# Patient Record
Sex: FEMALE | Race: BLACK OR AFRICAN AMERICAN | NOT HISPANIC OR LATINO | Employment: UNEMPLOYED | URBAN - METROPOLITAN AREA
[De-identification: names, ages, dates, MRNs, and addresses within clinical notes are randomized per-mention and may not be internally consistent; named-entity substitution may affect disease eponyms.]

---

## 2017-09-27 ENCOUNTER — HISTORICAL (OUTPATIENT)
Dept: INTERNAL MEDICINE | Facility: CLINIC | Age: 52
End: 2017-09-27

## 2017-09-29 ENCOUNTER — HISTORICAL (OUTPATIENT)
Dept: INTERNAL MEDICINE | Facility: CLINIC | Age: 52
End: 2017-09-29

## 2017-09-29 LAB
ABS NEUT (OLG): 8.75 X10(3)/MCL (ref 2.1–9.2)
ALBUMIN SERPL-MCNC: 3.4 GM/DL (ref 3.4–5)
ALBUMIN/GLOB SERPL: 1 RATIO (ref 1–2)
ALP SERPL-CCNC: 72 UNIT/L (ref 45–117)
ALT SERPL-CCNC: 19 UNIT/L (ref 12–78)
AST SERPL-CCNC: 15 UNIT/L (ref 15–37)
BASOPHILS # BLD AUTO: 0.06 X10(3)/MCL
BASOPHILS NFR BLD AUTO: 0 % (ref 0–1)
BILIRUB SERPL-MCNC: 0.3 MG/DL (ref 0.2–1)
BILIRUBIN DIRECT+TOT PNL SERPL-MCNC: <0.1 MG/DL
BILIRUBIN DIRECT+TOT PNL SERPL-MCNC: >0.2 MG/DL
BUN SERPL-MCNC: 14 MG/DL (ref 7–18)
CALCIUM SERPL-MCNC: 9 MG/DL (ref 8.5–10.1)
CHLORIDE SERPL-SCNC: 103 MMOL/L (ref 98–107)
CHOLEST SERPL-MCNC: 202 MG/DL
CHOLEST/HDLC SERPL: 4.3 {RATIO} (ref 0–4.4)
CO2 SERPL-SCNC: 31 MMOL/L (ref 21–32)
CREAT SERPL-MCNC: 0.6 MG/DL (ref 0.6–1.3)
CREAT UR-MCNC: 73 MG/DL
CREAT UR-MCNC: 73 MG/DL
EOSINOPHIL # BLD AUTO: 0.63 X10(3)/MCL
EOSINOPHIL NFR BLD AUTO: 4 % (ref 0–5)
ERYTHROCYTE [DISTWIDTH] IN BLOOD BY AUTOMATED COUNT: 14.9 % (ref 11.5–14.5)
EST. AVERAGE GLUCOSE BLD GHB EST-MCNC: 169 MG/DL
GLOBULIN SER-MCNC: 4.1 GM/ML (ref 2.3–3.5)
GLUCOSE SERPL-MCNC: 175 MG/DL (ref 74–106)
HBA1C MFR BLD: 7.5 % (ref 4.2–6.3)
HCT VFR BLD AUTO: 39.1 % (ref 35–46)
HDLC SERPL-MCNC: 47 MG/DL
HGB BLD-MCNC: 13 GM/DL (ref 12–16)
IMM GRANULOCYTES # BLD AUTO: 0.04 10*3/UL
IMM GRANULOCYTES NFR BLD AUTO: 0 %
LDLC SERPL CALC-MCNC: 128 MG/DL (ref 0–130)
LYMPHOCYTES # BLD AUTO: 4.44 X10(3)/MCL
LYMPHOCYTES NFR BLD AUTO: 30 % (ref 15–40)
MCH RBC QN AUTO: 29.1 PG (ref 26–34)
MCHC RBC AUTO-ENTMCNC: 33.2 GM/DL (ref 31–37)
MCV RBC AUTO: 87.5 FL (ref 80–100)
MICROALBUMIN UR-MCNC: <5 MG/L (ref 0–19)
MICROALBUMIN/CREAT RATIO PNL UR: <6.8 MCG/MG CR (ref 0–29)
MONOCYTES # BLD AUTO: 0.92 X10(3)/MCL
MONOCYTES NFR BLD AUTO: 6 % (ref 4–12)
NEUTROPHILS # BLD AUTO: 8.75 X10(3)/MCL
NEUTROPHILS NFR BLD AUTO: 59 X10(3)/MCL
PLATELET # BLD AUTO: 256 X10(3)/MCL (ref 130–400)
PMV BLD AUTO: 11.1 FL (ref 7.4–10.4)
POTASSIUM SERPL-SCNC: 4.4 MMOL/L (ref 3.5–5.1)
PROT SERPL-MCNC: 7.5 GM/DL (ref 6.4–8.2)
PROT UR STRIP-MCNC: <5 MG/DL
PROT/CREAT UR-RTO: <68.5 MG/GM
RBC # BLD AUTO: 4.47 X10(6)/MCL (ref 4–5.2)
SODIUM SERPL-SCNC: 140 MMOL/L (ref 136–145)
TRIGL SERPL-MCNC: 133 MG/DL
VLDLC SERPL CALC-MCNC: 27 MG/DL
WBC # SPEC AUTO: 14.8 X10(3)/MCL (ref 4.5–11)

## 2018-06-14 ENCOUNTER — HISTORICAL (OUTPATIENT)
Dept: INTERNAL MEDICINE | Facility: CLINIC | Age: 53
End: 2018-06-14

## 2018-06-14 LAB
EST. AVERAGE GLUCOSE BLD GHB EST-MCNC: 166 MG/DL
HBA1C MFR BLD: 7.4 % (ref 4.2–6.3)

## 2018-10-04 ENCOUNTER — HISTORICAL (OUTPATIENT)
Dept: ADMINISTRATIVE | Facility: HOSPITAL | Age: 53
End: 2018-10-04

## 2018-10-04 LAB
ABS NEUT (OLG): 6.96 X10(3)/MCL (ref 2.1–9.2)
ALBUMIN SERPL-MCNC: 3.4 GM/DL (ref 3.4–5)
ALBUMIN/GLOB SERPL: 1 RATIO (ref 1–2)
ALP SERPL-CCNC: 75 UNIT/L (ref 45–117)
ALT SERPL-CCNC: 21 UNIT/L (ref 12–78)
AST SERPL-CCNC: 15 UNIT/L (ref 15–37)
BASOPHILS # BLD AUTO: 0.06 X10(3)/MCL
BASOPHILS NFR BLD AUTO: 0 %
BILIRUB SERPL-MCNC: 0.4 MG/DL (ref 0.2–1)
BILIRUBIN DIRECT+TOT PNL SERPL-MCNC: 0.1 MG/DL
BILIRUBIN DIRECT+TOT PNL SERPL-MCNC: 0.3 MG/DL
BUN SERPL-MCNC: 10 MG/DL (ref 7–18)
CALCIUM SERPL-MCNC: 8.8 MG/DL (ref 8.5–10.1)
CHLORIDE SERPL-SCNC: 104 MMOL/L (ref 98–107)
CHOLEST SERPL-MCNC: 213 MG/DL
CHOLEST/HDLC SERPL: 5 {RATIO} (ref 0–4.4)
CO2 SERPL-SCNC: 30 MMOL/L (ref 21–32)
CREAT SERPL-MCNC: 0.6 MG/DL (ref 0.6–1.3)
CREAT UR-MCNC: 293 MG/DL
DEPRECATED CALCIDIOL+CALCIFEROL SERPL-MC: 12.27 NG/ML (ref 30–80)
EOSINOPHIL # BLD AUTO: 0.74 X10(3)/MCL
EOSINOPHIL NFR BLD AUTO: 6 %
ERYTHROCYTE [DISTWIDTH] IN BLOOD BY AUTOMATED COUNT: 14.6 % (ref 11.5–14.5)
EST. AVERAGE GLUCOSE BLD GHB EST-MCNC: 166 MG/DL
GLOBULIN SER-MCNC: 4.6 GM/ML (ref 2.3–3.5)
GLUCOSE SERPL-MCNC: 178 MG/DL (ref 74–106)
HBA1C MFR BLD: 7.4 % (ref 4.2–6.3)
HCT VFR BLD AUTO: 41.6 % (ref 35–46)
HDLC SERPL-MCNC: 43 MG/DL
HGB BLD-MCNC: 13.4 GM/DL (ref 12–16)
IMM GRANULOCYTES # BLD AUTO: 0.03 10*3/UL
IMM GRANULOCYTES NFR BLD AUTO: 0 %
LDLC SERPL CALC-MCNC: 140 MG/DL (ref 0–130)
LYMPHOCYTES # BLD AUTO: 4.05 X10(3)/MCL
LYMPHOCYTES NFR BLD AUTO: 32 % (ref 13–40)
MCH RBC QN AUTO: 28.3 PG (ref 26–34)
MCHC RBC AUTO-ENTMCNC: 32.2 GM/DL (ref 31–37)
MCV RBC AUTO: 87.8 FL (ref 80–100)
MICROALBUMIN UR-MCNC: 20 MG/L (ref 0–19)
MICROALBUMIN/CREAT RATIO PNL UR: 6.8 MCG/MG CR (ref 0–29)
MONOCYTES # BLD AUTO: 0.83 X10(3)/MCL
MONOCYTES NFR BLD AUTO: 7 % (ref 4–12)
NEUTROPHILS # BLD AUTO: 6.96 X10(3)/MCL
NEUTROPHILS NFR BLD AUTO: 55 X10(3)/MCL
PLATELET # BLD AUTO: 265 X10(3)/MCL (ref 130–400)
PMV BLD AUTO: 10.9 FL (ref 7.4–10.4)
POTASSIUM SERPL-SCNC: 3.8 MMOL/L (ref 3.5–5.1)
PROT SERPL-MCNC: 8 GM/DL (ref 6.4–8.2)
RBC # BLD AUTO: 4.74 X10(6)/MCL (ref 4–5.2)
SODIUM SERPL-SCNC: 140 MMOL/L (ref 136–145)
TRIGL SERPL-MCNC: 148 MG/DL
VLDLC SERPL CALC-MCNC: 30 MG/DL
WBC # SPEC AUTO: 12.7 X10(3)/MCL (ref 4.5–11)

## 2019-04-23 ENCOUNTER — HISTORICAL (OUTPATIENT)
Dept: INTERNAL MEDICINE | Facility: CLINIC | Age: 54
End: 2019-04-23

## 2019-04-25 ENCOUNTER — HISTORICAL (OUTPATIENT)
Dept: RADIOLOGY | Facility: HOSPITAL | Age: 54
End: 2019-04-25

## 2019-05-07 ENCOUNTER — HISTORICAL (OUTPATIENT)
Dept: ADMINISTRATIVE | Facility: HOSPITAL | Age: 54
End: 2019-05-07

## 2019-05-07 LAB
ABS NEUT (OLG): 7.32 X10(3)/MCL (ref 2.1–9.2)
ALBUMIN SERPL-MCNC: 3.7 GM/DL (ref 3.4–5)
ALBUMIN/GLOB SERPL: 0.8 RATIO (ref 1.1–2)
ALP SERPL-CCNC: 82 UNIT/L (ref 45–117)
ALT SERPL-CCNC: 19 UNIT/L (ref 12–78)
APPEARANCE, UA: CLEAR
AST SERPL-CCNC: 14 UNIT/L (ref 15–37)
BACTERIA #/AREA URNS AUTO: ABNORMAL /[HPF]
BASOPHILS # BLD AUTO: 0.09 X10(3)/MCL
BASOPHILS NFR BLD AUTO: 1 %
BILIRUB SERPL-MCNC: 0.3 MG/DL (ref 0.2–1)
BILIRUB UR QL STRIP: NEGATIVE
BILIRUBIN DIRECT+TOT PNL SERPL-MCNC: 0.1 MG/DL
BILIRUBIN DIRECT+TOT PNL SERPL-MCNC: 0.2 MG/DL
BUN SERPL-MCNC: 13 MG/DL (ref 7–18)
CALCIUM SERPL-MCNC: 9.6 MG/DL (ref 8.5–10.1)
CHLORIDE SERPL-SCNC: 105 MMOL/L (ref 98–107)
CHOLEST SERPL-MCNC: 174 MG/DL
CHOLEST/HDLC SERPL: 3.6 {RATIO} (ref 0–4.4)
CO2 SERPL-SCNC: 31 MMOL/L (ref 21–32)
COLOR UR: COLORLESS
CREAT SERPL-MCNC: 0.6 MG/DL (ref 0.6–1.3)
CREAT UR-MCNC: 14 MG/DL
DEPRECATED CALCIDIOL+CALCIFEROL SERPL-MC: 61.7 NG/ML (ref 30–80)
EOSINOPHIL # BLD AUTO: 0.78 10*3/UL
EOSINOPHIL NFR BLD AUTO: 6 %
ERYTHROCYTE [DISTWIDTH] IN BLOOD BY AUTOMATED COUNT: 14.4 % (ref 11.5–14.5)
EST. AVERAGE GLUCOSE BLD GHB EST-MCNC: 171 MG/DL
GLOBULIN SER-MCNC: 4.4 GM/ML (ref 2.3–3.5)
GLUCOSE (UA): NORMAL
GLUCOSE SERPL-MCNC: 118 MG/DL (ref 74–106)
HBA1C MFR BLD: 7.6 % (ref 4.2–6.3)
HCT VFR BLD AUTO: 42.6 % (ref 35–46)
HDLC SERPL-MCNC: 48 MG/DL
HGB BLD-MCNC: 13.4 GM/DL (ref 12–16)
HGB UR QL STRIP: NEGATIVE
HYALINE CASTS #/AREA URNS LPF: ABNORMAL /[LPF]
IMM GRANULOCYTES # BLD AUTO: 0.05 10*3/UL
IMM GRANULOCYTES NFR BLD AUTO: 0 %
KETONES UR QL STRIP: NEGATIVE
LDLC SERPL CALC-MCNC: 100 MG/DL (ref 0–130)
LEUKOCYTE ESTERASE UR QL STRIP: NEGATIVE
LYMPHOCYTES # BLD AUTO: 4.04 X10(3)/MCL
LYMPHOCYTES NFR BLD AUTO: 31 % (ref 13–40)
MCH RBC QN AUTO: 28.2 PG (ref 26–34)
MCHC RBC AUTO-ENTMCNC: 31.5 GM/DL (ref 31–37)
MCV RBC AUTO: 89.7 FL (ref 80–100)
MICROALBUMIN UR-MCNC: <5 MG/L (ref 0–19)
MICROALBUMIN/CREAT RATIO PNL UR: <35.7 MCG/MG CR (ref 0–29)
MONOCYTES # BLD AUTO: 0.79 X10(3)/MCL
MONOCYTES NFR BLD AUTO: 6 % (ref 4–12)
NEUTROPHILS # BLD AUTO: 7.32 X10(3)/MCL
NEUTROPHILS NFR BLD AUTO: 56 X10(3)/MCL
NITRITE UR QL STRIP: NEGATIVE
PH UR STRIP: 5.5 [PH] (ref 4.5–8)
PLATELET # BLD AUTO: 279 X10(3)/MCL (ref 130–400)
PMV BLD AUTO: 11.3 FL (ref 7.4–10.4)
POTASSIUM SERPL-SCNC: 3.7 MMOL/L (ref 3.5–5.1)
PROT SERPL-MCNC: 8.1 GM/DL (ref 6.4–8.2)
PROT UR QL STRIP: NEGATIVE
RBC # BLD AUTO: 4.75 X10(6)/MCL (ref 4–5.2)
RBC #/AREA URNS AUTO: ABNORMAL /[HPF]
SODIUM SERPL-SCNC: 140 MMOL/L (ref 136–145)
SP GR UR STRIP: 1 (ref 1–1.03)
SQUAMOUS #/AREA URNS LPF: <1 /[LPF]
TRIGL SERPL-MCNC: 131 MG/DL
UROBILINOGEN UR STRIP-ACNC: NORMAL
VLDLC SERPL CALC-MCNC: 26 MG/DL
WBC # SPEC AUTO: 13.1 X10(3)/MCL (ref 4.5–11)
WBC #/AREA URNS AUTO: ABNORMAL /HPF

## 2019-05-13 ENCOUNTER — HISTORICAL (OUTPATIENT)
Dept: RADIOLOGY | Facility: HOSPITAL | Age: 54
End: 2019-05-13

## 2019-06-25 ENCOUNTER — HISTORICAL (OUTPATIENT)
Dept: RADIOLOGY | Facility: HOSPITAL | Age: 54
End: 2019-06-25

## 2020-01-09 ENCOUNTER — HISTORICAL (OUTPATIENT)
Dept: CARDIOLOGY | Facility: CLINIC | Age: 55
End: 2020-01-09

## 2020-01-09 ENCOUNTER — HISTORICAL (OUTPATIENT)
Dept: ADMINISTRATIVE | Facility: HOSPITAL | Age: 55
End: 2020-01-09

## 2020-01-09 LAB
ALBUMIN SERPL-MCNC: 3.7 GM/DL (ref 3.4–5)
ALBUMIN/GLOB SERPL: 0.9 RATIO (ref 1.1–2)
ALP SERPL-CCNC: 72 UNIT/L (ref 45–117)
ALT SERPL-CCNC: 17 UNIT/L (ref 12–78)
AST SERPL-CCNC: 12 UNIT/L (ref 15–37)
BILIRUB SERPL-MCNC: 0.5 MG/DL (ref 0.2–1)
BILIRUBIN DIRECT+TOT PNL SERPL-MCNC: 0.1 MG/DL (ref 0–0.2)
BILIRUBIN DIRECT+TOT PNL SERPL-MCNC: 0.4 MG/DL
BUN SERPL-MCNC: 12 MG/DL (ref 7–18)
CALCIUM SERPL-MCNC: 9.3 MG/DL (ref 8.5–10.1)
CHLORIDE SERPL-SCNC: 104 MMOL/L (ref 98–107)
CHOLEST SERPL-MCNC: 187 MG/DL
CHOLEST/HDLC SERPL: 3.7 {RATIO} (ref 0–4.4)
CO2 SERPL-SCNC: 30 MMOL/L (ref 21–32)
CREAT SERPL-MCNC: 0.6 MG/DL (ref 0.6–1.3)
GLOBULIN SER-MCNC: 4.2 GM/ML (ref 2.3–3.5)
GLUCOSE SERPL-MCNC: 47 MG/DL (ref 74–106)
HDLC SERPL-MCNC: 50 MG/DL (ref 40–59)
LDLC SERPL CALC-MCNC: 113 MG/DL
POTASSIUM SERPL-SCNC: 3.8 MMOL/L (ref 3.5–5.1)
PROT SERPL-MCNC: 7.9 GM/DL (ref 6.4–8.2)
SODIUM SERPL-SCNC: 142 MMOL/L (ref 136–145)
TRIGL SERPL-MCNC: 121 MG/DL
VLDLC SERPL CALC-MCNC: 24 MG/DL

## 2020-07-22 ENCOUNTER — HISTORICAL (OUTPATIENT)
Dept: ADMINISTRATIVE | Facility: HOSPITAL | Age: 55
End: 2020-07-22

## 2020-07-22 LAB
HBV SURFACE AG SERPL QL IA: NONREACTIVE
HCV AB SERPL QL IA: NONREACTIVE
HIV 1+2 AB+HIV1 P24 AG SERPL QL IA: NONREACTIVE
T PALLIDUM AB SER QL: NONREACTIVE

## 2020-08-17 ENCOUNTER — HISTORICAL (OUTPATIENT)
Dept: GASTROENTEROLOGY | Facility: CLINIC | Age: 55
End: 2020-08-17

## 2020-08-20 ENCOUNTER — HISTORICAL (OUTPATIENT)
Dept: ENDOSCOPY | Facility: HOSPITAL | Age: 55
End: 2020-08-20

## 2020-08-20 LAB — CRC RECOMMENDATION EXT: NORMAL

## 2020-09-14 ENCOUNTER — HISTORICAL (OUTPATIENT)
Dept: RADIOLOGY | Facility: HOSPITAL | Age: 55
End: 2020-09-14

## 2020-09-28 ENCOUNTER — HISTORICAL (OUTPATIENT)
Dept: RADIOLOGY | Facility: HOSPITAL | Age: 55
End: 2020-09-28

## 2020-11-20 ENCOUNTER — HISTORICAL (OUTPATIENT)
Dept: RADIOLOGY | Facility: HOSPITAL | Age: 55
End: 2020-11-20

## 2020-12-08 ENCOUNTER — HISTORICAL (OUTPATIENT)
Dept: RESPIRATORY THERAPY | Facility: HOSPITAL | Age: 55
End: 2020-12-08

## 2020-12-21 ENCOUNTER — HISTORICAL (OUTPATIENT)
Dept: INTERNAL MEDICINE | Facility: CLINIC | Age: 55
End: 2020-12-21

## 2020-12-21 LAB
ABS NEUT (OLG): 7.75 X10(3)/MCL (ref 2.1–9.2)
ALBUMIN SERPL-MCNC: 3.7 GM/DL (ref 3.5–5)
ALBUMIN/GLOB SERPL: 1 RATIO (ref 1.1–2)
ALP SERPL-CCNC: 77 UNIT/L (ref 40–150)
ALT SERPL-CCNC: 11 UNIT/L (ref 0–55)
AST SERPL-CCNC: 11 UNIT/L (ref 5–34)
BASOPHILS # BLD AUTO: 0 X10(3)/MCL (ref 0–0.2)
BASOPHILS NFR BLD AUTO: 0 %
BILIRUB SERPL-MCNC: 0.5 MG/DL
BILIRUBIN DIRECT+TOT PNL SERPL-MCNC: 0.2 MG/DL (ref 0–0.5)
BILIRUBIN DIRECT+TOT PNL SERPL-MCNC: 0.3 MG/DL (ref 0–0.8)
BUN SERPL-MCNC: 9 MG/DL (ref 9.8–20.1)
CALCIUM SERPL-MCNC: 9.6 MG/DL (ref 8.4–10.2)
CHLORIDE SERPL-SCNC: 100 MMOL/L (ref 98–107)
CHOLEST SERPL-MCNC: 187 MG/DL
CHOLEST/HDLC SERPL: 4 {RATIO} (ref 0–5)
CO2 SERPL-SCNC: 28 MMOL/L (ref 22–29)
CREAT SERPL-MCNC: 0.63 MG/DL (ref 0.55–1.02)
EOSINOPHIL # BLD AUTO: 0.4 X10(3)/MCL (ref 0–0.9)
EOSINOPHIL NFR BLD AUTO: 3 %
ERYTHROCYTE [DISTWIDTH] IN BLOOD BY AUTOMATED COUNT: 14.7 % (ref 11.5–14.5)
EST. AVERAGE GLUCOSE BLD GHB EST-MCNC: 162.8 MG/DL
GLOBULIN SER-MCNC: 3.7 GM/DL (ref 2.4–3.5)
GLUCOSE SERPL-MCNC: 161 MG/DL (ref 74–100)
HBA1C MFR BLD: 7.3 %
HCT VFR BLD AUTO: 39.6 % (ref 35–46)
HDLC SERPL-MCNC: 44 MG/DL (ref 35–60)
HGB BLD-MCNC: 12.7 GM/DL (ref 12–16)
IMM GRANULOCYTES # BLD AUTO: 0.05 10*3/UL
IMM GRANULOCYTES NFR BLD AUTO: 0 %
LDLC SERPL CALC-MCNC: 113 MG/DL (ref 50–140)
LYMPHOCYTES # BLD AUTO: 4.4 X10(3)/MCL (ref 0.6–4.6)
LYMPHOCYTES NFR BLD AUTO: 32 %
MCH RBC QN AUTO: 28.7 PG (ref 26–34)
MCHC RBC AUTO-ENTMCNC: 32.1 GM/DL (ref 31–37)
MCV RBC AUTO: 89.6 FL (ref 80–100)
MONOCYTES # BLD AUTO: 0.9 X10(3)/MCL (ref 0.1–1.3)
MONOCYTES NFR BLD AUTO: 7 %
NEUTROPHILS # BLD AUTO: 7.75 X10(3)/MCL (ref 2.1–9.2)
NEUTROPHILS NFR BLD AUTO: 57 %
PLATELET # BLD AUTO: 280 X10(3)/MCL (ref 130–400)
PMV BLD AUTO: 11.1 FL (ref 7.4–10.4)
POTASSIUM SERPL-SCNC: 4 MMOL/L (ref 3.5–5.1)
PROT SERPL-MCNC: 7.4 GM/DL (ref 6.4–8.3)
RBC # BLD AUTO: 4.42 X10(6)/MCL (ref 4–5.2)
SODIUM SERPL-SCNC: 139 MMOL/L (ref 136–145)
TRIGL SERPL-MCNC: 151 MG/DL (ref 37–140)
VLDLC SERPL CALC-MCNC: 30 MG/DL
WBC # SPEC AUTO: 13.6 X10(3)/MCL (ref 4.5–11)

## 2021-01-07 ENCOUNTER — HISTORICAL (OUTPATIENT)
Dept: CARDIOLOGY | Facility: HOSPITAL | Age: 56
End: 2021-01-07

## 2021-01-07 LAB
ABS NEUT (OLG): 8.42 X10(3)/MCL (ref 2.1–9.2)
APTT PPP: 32.1 SECOND(S) (ref 23.3–37)
BASOPHILS # BLD AUTO: 0.1 X10(3)/MCL (ref 0–0.2)
BASOPHILS NFR BLD AUTO: 0 %
BUN SERPL-MCNC: 13 MG/DL (ref 9.8–20.1)
CALCIUM SERPL-MCNC: 9.5 MG/DL (ref 8.4–10.2)
CHLORIDE SERPL-SCNC: 101 MMOL/L (ref 98–107)
CO2 SERPL-SCNC: 26 MMOL/L (ref 22–29)
CREAT SERPL-MCNC: 0.73 MG/DL (ref 0.55–1.02)
CREAT/UREA NIT SERPL: 18
EOSINOPHIL # BLD AUTO: 0.4 X10(3)/MCL (ref 0–0.9)
EOSINOPHIL NFR BLD AUTO: 3 %
ERYTHROCYTE [DISTWIDTH] IN BLOOD BY AUTOMATED COUNT: 14.4 % (ref 11.5–14.5)
GLUCOSE SERPL-MCNC: 250 MG/DL (ref 74–100)
HCT VFR BLD AUTO: 38.7 % (ref 35–46)
HGB BLD-MCNC: 12.5 GM/DL (ref 12–16)
IMM GRANULOCYTES # BLD AUTO: 0.04 10*3/UL
IMM GRANULOCYTES NFR BLD AUTO: 0 %
INR PPP: 1.1 (ref 0.9–1.2)
LYMPHOCYTES # BLD AUTO: 4.2 X10(3)/MCL (ref 0.6–4.6)
LYMPHOCYTES NFR BLD AUTO: 30 %
MCH RBC QN AUTO: 28.7 PG (ref 26–34)
MCHC RBC AUTO-ENTMCNC: 32.3 GM/DL (ref 31–37)
MCV RBC AUTO: 88.8 FL (ref 80–100)
MONOCYTES # BLD AUTO: 0.7 X10(3)/MCL (ref 0.1–1.3)
MONOCYTES NFR BLD AUTO: 5 %
NEUTROPHILS # BLD AUTO: 8.42 X10(3)/MCL (ref 2.1–9.2)
NEUTROPHILS NFR BLD AUTO: 61 %
PLATELET # BLD AUTO: 249 X10(3)/MCL (ref 130–400)
PMV BLD AUTO: 11.2 FL (ref 7.4–10.4)
POTASSIUM SERPL-SCNC: 4 MMOL/L (ref 3.5–5.1)
PROTHROMBIN TIME: 13.8 SECOND(S) (ref 11.9–14.4)
RBC # BLD AUTO: 4.36 X10(6)/MCL (ref 4–5.2)
SODIUM SERPL-SCNC: 138 MMOL/L (ref 136–145)
WBC # SPEC AUTO: 13.8 X10(3)/MCL (ref 4.5–11)

## 2021-01-11 ENCOUNTER — HISTORICAL (OUTPATIENT)
Dept: CARDIOLOGY | Facility: HOSPITAL | Age: 56
End: 2021-01-11

## 2021-04-15 ENCOUNTER — HISTORICAL (OUTPATIENT)
Dept: INTERNAL MEDICINE | Facility: CLINIC | Age: 56
End: 2021-04-15

## 2021-04-15 LAB
BUN SERPL-MCNC: 12.3 MG/DL (ref 9.8–20.1)
CALCIUM SERPL-MCNC: 9.4 MG/DL (ref 8.4–10.2)
CHLORIDE SERPL-SCNC: 103 MMOL/L (ref 98–107)
CHOLEST SERPL-MCNC: 118 MG/DL
CHOLEST/HDLC SERPL: 3 {RATIO} (ref 0–5)
CO2 SERPL-SCNC: 29 MMOL/L (ref 22–29)
CREAT SERPL-MCNC: 0.64 MG/DL (ref 0.55–1.02)
CREAT/UREA NIT SERPL: 19
EST. AVERAGE GLUCOSE BLD GHB EST-MCNC: 171.4 MG/DL
GLUCOSE SERPL-MCNC: 105 MG/DL (ref 74–100)
HBA1C MFR BLD: 7.6 %
HDLC SERPL-MCNC: 39 MG/DL (ref 35–60)
LDLC SERPL CALC-MCNC: 59 MG/DL (ref 50–140)
POTASSIUM SERPL-SCNC: 4.1 MMOL/L (ref 3.5–5.1)
SODIUM SERPL-SCNC: 142 MMOL/L (ref 136–145)
TRIGL SERPL-MCNC: 101 MG/DL (ref 37–140)
VLDLC SERPL CALC-MCNC: 20 MG/DL

## 2021-10-21 ENCOUNTER — HISTORICAL (OUTPATIENT)
Dept: RADIOLOGY | Facility: HOSPITAL | Age: 56
End: 2021-10-21

## 2021-10-21 LAB
ABS NEUT (OLG): 6.76 X10(3)/MCL (ref 2.1–9.2)
ALBUMIN SERPL-MCNC: 3.6 GM/DL (ref 3.5–5)
ALBUMIN/GLOB SERPL: 1 RATIO (ref 1.1–2)
ALP SERPL-CCNC: 65 UNIT/L (ref 40–150)
ALT SERPL-CCNC: 7 UNIT/L (ref 0–55)
AST SERPL-CCNC: 12 UNIT/L (ref 5–34)
BASOPHILS # BLD AUTO: 0 X10(3)/MCL (ref 0–0.2)
BASOPHILS NFR BLD AUTO: 0 %
BILIRUB SERPL-MCNC: 0.6 MG/DL
BILIRUBIN DIRECT+TOT PNL SERPL-MCNC: 0.2 MG/DL (ref 0–0.5)
BILIRUBIN DIRECT+TOT PNL SERPL-MCNC: 0.4 MG/DL (ref 0–0.8)
BUN SERPL-MCNC: 11.8 MG/DL (ref 9.8–20.1)
CALCIUM SERPL-MCNC: 9.6 MG/DL (ref 8.4–10.2)
CHLORIDE SERPL-SCNC: 106 MMOL/L (ref 98–107)
CHOLEST SERPL-MCNC: 179 MG/DL
CHOLEST/HDLC SERPL: 4 {RATIO} (ref 0–5)
CO2 SERPL-SCNC: 26 MMOL/L (ref 22–29)
CREAT SERPL-MCNC: 0.68 MG/DL (ref 0.55–1.02)
DEPRECATED CALCIDIOL+CALCIFEROL SERPL-MC: 16.1 NG/ML (ref 30–80)
EOSINOPHIL # BLD AUTO: 0.5 X10(3)/MCL (ref 0–0.9)
EOSINOPHIL NFR BLD AUTO: 4 %
ERYTHROCYTE [DISTWIDTH] IN BLOOD BY AUTOMATED COUNT: 14.9 % (ref 11.5–14.5)
EST. AVERAGE GLUCOSE BLD GHB EST-MCNC: 154.2 MG/DL
GLOBULIN SER-MCNC: 3.5 GM/DL (ref 2.4–3.5)
GLUCOSE SERPL-MCNC: 168 MG/DL (ref 74–100)
HBA1C MFR BLD: 7 %
HCT VFR BLD AUTO: 36.6 % (ref 35–46)
HDLC SERPL-MCNC: 43 MG/DL (ref 35–60)
HGB BLD-MCNC: 12 GM/DL (ref 12–16)
IMM GRANULOCYTES # BLD AUTO: 0.03 10*3/UL
IMM GRANULOCYTES NFR BLD AUTO: 0 %
LDLC SERPL CALC-MCNC: 111 MG/DL (ref 50–140)
LYMPHOCYTES # BLD AUTO: 4.1 X10(3)/MCL (ref 0.6–4.6)
LYMPHOCYTES NFR BLD AUTO: 34 %
MCH RBC QN AUTO: 28.8 PG (ref 26–34)
MCHC RBC AUTO-ENTMCNC: 32.8 GM/DL (ref 31–37)
MCV RBC AUTO: 88 FL (ref 80–100)
MONOCYTES # BLD AUTO: 0.9 X10(3)/MCL (ref 0.1–1.3)
MONOCYTES NFR BLD AUTO: 7 %
NEUTROPHILS # BLD AUTO: 6.76 X10(3)/MCL (ref 2.1–9.2)
NEUTROPHILS NFR BLD AUTO: 55 %
NRBC BLD AUTO-RTO: 0 % (ref 0–0.2)
PLATELET # BLD AUTO: 277 X10(3)/MCL (ref 130–400)
PMV BLD AUTO: 10.6 FL (ref 7.4–10.4)
POTASSIUM SERPL-SCNC: 3.8 MMOL/L (ref 3.5–5.1)
PROT SERPL-MCNC: 7.1 GM/DL (ref 6.4–8.3)
RBC # BLD AUTO: 4.16 X10(6)/MCL (ref 4–5.2)
SODIUM SERPL-SCNC: 139 MMOL/L (ref 136–145)
TRIGL SERPL-MCNC: 127 MG/DL (ref 37–140)
VLDLC SERPL CALC-MCNC: 25 MG/DL
WBC # SPEC AUTO: 12.3 X10(3)/MCL (ref 4.5–11)

## 2021-11-02 ENCOUNTER — HISTORICAL (OUTPATIENT)
Dept: RADIOLOGY | Facility: HOSPITAL | Age: 56
End: 2021-11-02

## 2022-02-11 LAB
LEFT EYE DM RETINOPATHY: NEGATIVE
RIGHT EYE DM RETINOPATHY: NEGATIVE

## 2022-04-11 ENCOUNTER — HISTORICAL (OUTPATIENT)
Dept: ADMINISTRATIVE | Facility: HOSPITAL | Age: 57
End: 2022-04-11
Payer: MEDICAID

## 2022-04-27 VITALS
OXYGEN SATURATION: 100 % | DIASTOLIC BLOOD PRESSURE: 57 MMHG | SYSTOLIC BLOOD PRESSURE: 123 MMHG | BODY MASS INDEX: 39.94 KG/M2 | HEIGHT: 64 IN | WEIGHT: 233.94 LBS

## 2022-04-28 ENCOUNTER — HISTORICAL (OUTPATIENT)
Dept: ADMINISTRATIVE | Facility: HOSPITAL | Age: 57
End: 2022-04-28
Payer: MEDICAID

## 2022-05-11 RX ORDER — LISINOPRIL AND HYDROCHLOROTHIAZIDE 20; 25 MG/1; MG/1
TABLET ORAL
COMMUNITY
Start: 2022-01-27 | End: 2022-05-12 | Stop reason: SDUPTHER

## 2022-05-11 RX ORDER — ATORVASTATIN CALCIUM 40 MG/1
40 TABLET, FILM COATED ORAL
COMMUNITY
Start: 2021-07-27 | End: 2022-05-12 | Stop reason: SDUPTHER

## 2022-05-11 RX ORDER — EZETIMIBE 10 MG/1
10 TABLET ORAL
COMMUNITY
Start: 2021-10-27 | End: 2022-05-12 | Stop reason: SDUPTHER

## 2022-05-11 RX ORDER — AMLODIPINE BESYLATE 10 MG/1
10 TABLET ORAL
COMMUNITY
Start: 2021-10-27 | End: 2022-05-12 | Stop reason: SDUPTHER

## 2022-05-11 RX ORDER — GABAPENTIN 300 MG/1
300 CAPSULE ORAL
COMMUNITY
Start: 2022-01-18 | End: 2022-05-12 | Stop reason: SDUPTHER

## 2022-05-11 RX ORDER — ALBUTEROL SULFATE 90 UG/1
AEROSOL, METERED RESPIRATORY (INHALATION)
COMMUNITY
Start: 2020-12-23 | End: 2024-03-11 | Stop reason: SDUPTHER

## 2022-05-11 RX ORDER — NITROGLYCERIN 0.4 MG/1
0.4 TABLET SUBLINGUAL
COMMUNITY
Start: 2021-08-19

## 2022-05-11 RX ORDER — METFORMIN HYDROCHLORIDE 1000 MG/1
1000 TABLET ORAL
COMMUNITY
Start: 2021-10-27 | End: 2022-05-12 | Stop reason: SDUPTHER

## 2022-05-12 ENCOUNTER — OFFICE VISIT (OUTPATIENT)
Dept: INTERNAL MEDICINE | Facility: CLINIC | Age: 57
End: 2022-05-12
Payer: MEDICAID

## 2022-05-12 ENCOUNTER — LAB VISIT (OUTPATIENT)
Dept: LAB | Facility: HOSPITAL | Age: 57
End: 2022-05-12
Attending: STUDENT IN AN ORGANIZED HEALTH CARE EDUCATION/TRAINING PROGRAM
Payer: MEDICAID

## 2022-05-12 VITALS
DIASTOLIC BLOOD PRESSURE: 75 MMHG | HEIGHT: 65 IN | WEIGHT: 231.69 LBS | BODY MASS INDEX: 38.6 KG/M2 | TEMPERATURE: 98 F | OXYGEN SATURATION: 95 % | RESPIRATION RATE: 20 BRPM | HEART RATE: 65 BPM | SYSTOLIC BLOOD PRESSURE: 130 MMHG

## 2022-05-12 DIAGNOSIS — Z12.11 COLON CANCER SCREENING: ICD-10-CM

## 2022-05-12 DIAGNOSIS — M10.9 GOUT, UNSPECIFIED CAUSE, UNSPECIFIED CHRONICITY, UNSPECIFIED SITE: ICD-10-CM

## 2022-05-12 DIAGNOSIS — D72.829 LEUKOCYTOSIS, UNSPECIFIED TYPE: ICD-10-CM

## 2022-05-12 DIAGNOSIS — E11.9 TYPE 2 DIABETES MELLITUS WITHOUT COMPLICATION, WITHOUT LONG-TERM CURRENT USE OF INSULIN: ICD-10-CM

## 2022-05-12 DIAGNOSIS — E11.42 DIABETIC PERIPHERAL NEUROPATHY ASSOCIATED WITH TYPE 2 DIABETES MELLITUS: ICD-10-CM

## 2022-05-12 DIAGNOSIS — M10.9 GOUT OF RIGHT FOOT, UNSPECIFIED CAUSE, UNSPECIFIED CHRONICITY: ICD-10-CM

## 2022-05-12 DIAGNOSIS — I10 HYPERTENSION, UNSPECIFIED TYPE: ICD-10-CM

## 2022-05-12 DIAGNOSIS — D72.829 LEUKOCYTOSIS, UNSPECIFIED TYPE: Primary | ICD-10-CM

## 2022-05-12 DIAGNOSIS — E78.5 HYPERLIPIDEMIA, UNSPECIFIED HYPERLIPIDEMIA TYPE: ICD-10-CM

## 2022-05-12 LAB
BASOPHILS # BLD AUTO: 0.05 X10(3)/MCL (ref 0–0.2)
BASOPHILS NFR BLD AUTO: 0.4 %
EOSINOPHIL # BLD AUTO: 0.45 X10(3)/MCL (ref 0–0.9)
EOSINOPHIL NFR BLD AUTO: 3.4 %
ERYTHROCYTE [DISTWIDTH] IN BLOOD BY AUTOMATED COUNT: 14.7 % (ref 11.5–17)
EST. AVERAGE GLUCOSE BLD GHB EST-MCNC: 185.8 MG/DL
HBA1C MFR BLD: 8.1 %
HCT VFR BLD AUTO: 41.1 % (ref 37–47)
HGB BLD-MCNC: 13.1 GM/DL (ref 12–16)
IMM GRANULOCYTES # BLD AUTO: 0.04 X10(3)/MCL (ref 0–0.02)
IMM GRANULOCYTES NFR BLD AUTO: 0.3 % (ref 0–0.43)
LYMPHOCYTES # BLD AUTO: 3.89 X10(3)/MCL (ref 0.6–4.6)
LYMPHOCYTES NFR BLD AUTO: 29.8 %
MCH RBC QN AUTO: 28.1 PG (ref 27–31)
MCHC RBC AUTO-ENTMCNC: 31.9 MG/DL (ref 33–36)
MCV RBC AUTO: 88.2 FL (ref 80–94)
MONOCYTES # BLD AUTO: 0.8 X10(3)/MCL (ref 0.1–1.3)
MONOCYTES NFR BLD AUTO: 6.1 %
NEUTROPHILS # BLD AUTO: 7.8 X10(3)/MCL (ref 2.1–9.2)
NEUTROPHILS NFR BLD AUTO: 60 %
NRBC BLD AUTO-RTO: 0 %
PLATELET # BLD AUTO: 278 X10(3)/MCL (ref 130–400)
PMV BLD AUTO: 10.6 FL (ref 9.4–12.4)
RBC # BLD AUTO: 4.66 X10(6)/MCL (ref 4.2–5.4)
WBC # SPEC AUTO: 13.1 X10(3)/MCL (ref 4.5–11.5)

## 2022-05-12 PROCEDURE — 36415 COLL VENOUS BLD VENIPUNCTURE: CPT

## 2022-05-12 PROCEDURE — 99214 OFFICE O/P EST MOD 30 MIN: CPT | Mod: PBBFAC

## 2022-05-12 PROCEDURE — 85025 COMPLETE CBC W/AUTO DIFF WBC: CPT

## 2022-05-12 PROCEDURE — 83036 HEMOGLOBIN GLYCOSYLATED A1C: CPT

## 2022-05-12 RX ORDER — TRAZODONE HYDROCHLORIDE 50 MG/1
50 TABLET ORAL NIGHTLY
COMMUNITY
Start: 2022-01-28 | End: 2023-01-17

## 2022-05-12 RX ORDER — COLCHICINE 0.6 MG/1
TABLET ORAL
COMMUNITY
Start: 2022-04-28 | End: 2022-05-12

## 2022-05-12 RX ORDER — ATORVASTATIN CALCIUM 40 MG/1
40 TABLET, FILM COATED ORAL DAILY
Qty: 30 TABLET | Refills: 5 | Status: SHIPPED | OUTPATIENT
Start: 2022-05-12 | End: 2023-01-17 | Stop reason: SDUPTHER

## 2022-05-12 RX ORDER — GLIPIZIDE 10 MG/1
10 TABLET ORAL DAILY
COMMUNITY
Start: 2022-02-16 | End: 2022-05-12 | Stop reason: SDUPTHER

## 2022-05-12 RX ORDER — EZETIMIBE 10 MG/1
10 TABLET ORAL DAILY
Qty: 30 TABLET | Refills: 5 | Status: SHIPPED | OUTPATIENT
Start: 2022-05-12 | End: 2022-11-18 | Stop reason: SDUPTHER

## 2022-05-12 RX ORDER — INSULIN GLARGINE 100 [IU]/ML
16 INJECTION, SOLUTION SUBCUTANEOUS
COMMUNITY
Start: 2021-12-05 | End: 2023-01-17 | Stop reason: SDUPTHER

## 2022-05-12 RX ORDER — LISINOPRIL AND HYDROCHLOROTHIAZIDE 20; 25 MG/1; MG/1
1 TABLET ORAL DAILY
Qty: 30 TABLET | Refills: 5 | Status: SHIPPED | OUTPATIENT
Start: 2022-05-12 | End: 2023-01-17 | Stop reason: SDUPTHER

## 2022-05-12 RX ORDER — INDOMETHACIN 50 MG/1
CAPSULE ORAL
COMMUNITY
Start: 2022-04-28 | End: 2022-05-12

## 2022-05-12 RX ORDER — GABAPENTIN 300 MG/1
300 CAPSULE ORAL 3 TIMES DAILY
Qty: 90 CAPSULE | Refills: 5 | Status: SHIPPED | OUTPATIENT
Start: 2022-05-12 | End: 2023-01-17 | Stop reason: SDUPTHER

## 2022-05-12 RX ORDER — AMLODIPINE BESYLATE 10 MG/1
10 TABLET ORAL DAILY
Qty: 30 TABLET | Refills: 5 | Status: SHIPPED | OUTPATIENT
Start: 2022-05-12 | End: 2023-01-17 | Stop reason: SDUPTHER

## 2022-05-12 RX ORDER — GLIPIZIDE 10 MG/1
10 TABLET ORAL DAILY
Qty: 30 TABLET | Refills: 5 | Status: SHIPPED | OUTPATIENT
Start: 2022-05-12 | End: 2023-01-17 | Stop reason: SDUPTHER

## 2022-05-12 RX ORDER — ASPIRIN 81 MG/1
81 TABLET ORAL
COMMUNITY
End: 2023-01-17 | Stop reason: SDUPTHER

## 2022-05-12 RX ORDER — METFORMIN HYDROCHLORIDE 1000 MG/1
1000 TABLET ORAL 2 TIMES DAILY WITH MEALS
Qty: 60 TABLET | Refills: 5 | Status: SHIPPED | OUTPATIENT
Start: 2022-05-12 | End: 2023-01-17 | Stop reason: SDUPTHER

## 2022-05-12 NOTE — PROGRESS NOTES
Subjective:       Patient ID: Jill Dey is a 57 y.o. female.    Chief Complaint: Diabetic Eye Photo, Follow-up, and Diabetes    Patient is a 57 year old AAF with past medical history of hypertension, hyperlipidemia, leukocytosis, type 2 diabetes and gout who presents to IM clinic for scheduled follow up.  Patient states she has been doing well since her last visit.  She is continuing to watch her diet and increasing activity as tolerated., also continuing to lose some weight.  Patient did have ER visit on 04/28 for gout attack, patient has never had this happen before.  Patient was prescribed indomethacin and colchicine for the office, she took these for 2 days but could not tolerate that and states she vomited when taking them.  Foot pain resolved within 1 week and patient has not had any issues since.  Patient's daughter takes blood sugar for her home as well as blood pressure, has not had any issues with this.  She continues to have some issues with neuropathy, primarily left leg. This bothers her but is not debilitating and she can deal with it.  Patient is tolerating all medicines without difficulty.  Patient otherwise doing well and has no other acute complaints.    Review of Systems   Constitutional: Negative for chills and fever.   Respiratory: Negative for cough and shortness of breath.    Cardiovascular: Negative for chest pain and claudication.   Gastrointestinal: Negative for abdominal distention and abdominal pain.   Endocrine: Negative for cold intolerance and heat intolerance.   Genitourinary: Negative for dysuria.   Neurological: Positive for numbness. Negative for syncope and weakness.         Objective:      Physical Exam  Constitutional:       Appearance: Normal appearance.   HENT:      Head: Normocephalic and atraumatic.      Mouth/Throat:      Mouth: Mucous membranes are moist.      Pharynx: Oropharynx is clear.   Eyes:      Extraocular Movements: Extraocular movements intact.    Cardiovascular:      Rate and Rhythm: Normal rate and regular rhythm.      Pulses: Normal pulses.           Dorsalis pedis pulses are 2+ on the right side and 2+ on the left side.        Posterior tibial pulses are 2+ on the right side and 2+ on the left side.   Pulmonary:      Effort: Pulmonary effort is normal.      Breath sounds: Normal breath sounds.   Abdominal:      General: Abdomen is flat.      Palpations: Abdomen is soft.   Musculoskeletal:         General: Normal range of motion.      Cervical back: Normal range of motion and neck supple.      Right foot: Normal range of motion. No deformity.      Left foot: Normal range of motion. No deformity.   Feet:      Right foot:      Protective Sensation: 8 sites tested. 8 sites sensed.      Skin integrity: No ulcer, blister or skin breakdown.      Toenail Condition: Right toenails are normal.      Left foot:      Protective Sensation: 8 sites tested. 8 sites sensed.      Skin integrity: No ulcer, blister or skin breakdown.      Toenail Condition: Left toenails are normal.   Skin:     General: Skin is warm.      Capillary Refill: Capillary refill takes less than 2 seconds.   Neurological:      General: No focal deficit present.      Mental Status: She is alert.         Assessment:       Problem List Items Addressed This Visit    None     Visit Diagnoses     Leukocytosis, unspecified type    -  Primary    Relevant Orders    Hemoglobin A1C    CBC Auto Differential    Hematopath Consult, Peripheral Smear    Type 2 diabetes mellitus without complication, without long-term current use of insulin        Relevant Medications    LANTUS SOLOSTAR U-100 INSULIN glargine 100 units/mL (3mL) SubQ pen    glipiZIDE (GLUCOTROL) 10 MG tablet    metFORMIN (GLUCOPHAGE) 1000 MG tablet    Other Relevant Orders    Hemoglobin A1C    Hypertension, unspecified type        Gout, unspecified cause, unspecified chronicity, unspecified site        Diabetic peripheral neuropathy associated  with type 2 diabetes mellitus        Relevant Medications    LANTUS SOLOSTAR U-100 INSULIN glargine 100 units/mL (3mL) SubQ pen    glipiZIDE (GLUCOTROL) 10 MG tablet    metFORMIN (GLUCOPHAGE) 1000 MG tablet    Hyperlipidemia, unspecified hyperlipidemia type              Plan:       Type 2 diabetes mellitus with peripheral neuropathy  -A1c was 7.0 at last check, will recheck with labs today  - continue Metformin 1000 mg twice daily, glipizide 10 mg daily, Lantus 16 units nightly  -Counseled patient on continued balanced diet and exercise  -Continue gabapentin 300 mg 3 times daily  - goal to wean off of insulin if A1c continues to improve    Hypertension  -Patient should continue taking hydrochlorothiazide-lisinopril 25 mg daily, Amlodipine 10 mg daily  -Patient follows with cardiology  -blood pressure checks at home have been good    Hyperlipidemia  -Lipids looking good at last check  -Patient should continue taking atorvastatin 40 mg daily with ezetimibe    Hx of CVA with left-sided deficits  -Patient should continue statin and daily aspirin  -No residual symptoms over the last year    CAD  -Patient follows with cardiology  -No new chest pains or other cardiac symptoms    Persistent leukocytosis  -Patient has had elevated white count at every visit over the last 8 years  -Patient is not infectious appearing, has not had any fever or chills  - ordering CBC with peripheral smear to be done at lab today    Received shingles vaccine at last visit  Patient received Covid vaccines  Flu shot in October  Had mammogram in October  Will be due for annual gyn exam shortly  Will order Cologuard  Will schedule for diabetic eye exam, foot exam done in clinic today  Refilled medications and answered questions as appropriate  RTC 3 months

## 2022-05-16 ENCOUNTER — CLINICAL SUPPORT (OUTPATIENT)
Dept: INTERNAL MEDICINE | Facility: CLINIC | Age: 57
End: 2022-05-16
Attending: STUDENT IN AN ORGANIZED HEALTH CARE EDUCATION/TRAINING PROGRAM
Payer: MEDICAID

## 2022-05-16 DIAGNOSIS — E11.9 TYPE 2 DIABETES MELLITUS WITHOUT COMPLICATION, WITHOUT LONG-TERM CURRENT USE OF INSULIN: ICD-10-CM

## 2022-05-16 PROCEDURE — 2025F 7 FLD RTA PHOTO W/O RTNOPTHY: CPT | Mod: PBBFAC,CPTII | Performed by: INTERNAL MEDICINE

## 2022-05-16 PROCEDURE — 92228 IMG RTA DETC/MNTR DS PHY/QHP: CPT | Mod: PBBFAC

## 2022-05-16 NOTE — PROGRESS NOTES
Attending Addendum:   Patient seen and examined in clinic. Management and Plan were discussed with resident. Care was reasonable and necessary.   Soraida Pickett MD  [facility]

## 2022-05-18 NOTE — PROGRESS NOTES
Pt schedule for Diabetic Eye Screen for 5/13/2022 @ 10:45am and pt verbalized understanding and confirmed appointment.

## 2022-05-30 ENCOUNTER — PATIENT OUTREACH (OUTPATIENT)
Dept: ADMINISTRATIVE | Facility: HOSPITAL | Age: 57
End: 2022-05-30
Payer: MEDICAID

## 2022-05-30 NOTE — PROGRESS NOTES
Population Health Outreach.Records Received, hyper-linked into chart at this time. The following record(s)  below were uploaded for Health Maintenance .               8/20/2020 COLONOSCOPY

## 2022-06-15 DIAGNOSIS — Z12.11 SCREENING FOR COLORECTAL CANCER: Primary | ICD-10-CM

## 2022-06-15 DIAGNOSIS — Z12.12 SCREENING FOR COLORECTAL CANCER: Primary | ICD-10-CM

## 2022-06-17 ENCOUNTER — LAB VISIT (OUTPATIENT)
Dept: FAMILY MEDICINE | Facility: CLINIC | Age: 57
End: 2022-06-17
Payer: MEDICAID

## 2022-06-17 DIAGNOSIS — Z11.52 ENCOUNTER FOR SCREENING LABORATORY TESTING FOR COVID-19 VIRUS: Primary | ICD-10-CM

## 2022-06-17 LAB
CTP QC/QA: YES
SARS-COV-2 AG RESP QL IA.RAPID: NEGATIVE

## 2022-06-21 NOTE — PROGRESS NOTES
Jill Dey is a 57 y.o. female here for a diabetic eye screening with non-dilated fundus photos per Dr Hendrix.    Patient cooperative?: Yes  Small pupils?: No  Last eye exam: unknown    For exam results, see Encounter Report.

## 2022-08-10 ENCOUNTER — DOCUMENTATION ONLY (OUTPATIENT)
Dept: ADMINISTRATIVE | Facility: HOSPITAL | Age: 57
End: 2022-08-10
Payer: MEDICAID

## 2022-09-28 ENCOUNTER — OFFICE VISIT (OUTPATIENT)
Dept: INTERNAL MEDICINE | Facility: CLINIC | Age: 57
End: 2022-09-28
Payer: MEDICAID

## 2022-09-28 VITALS
OXYGEN SATURATION: 98 % | HEIGHT: 64 IN | WEIGHT: 230 LBS | TEMPERATURE: 99 F | DIASTOLIC BLOOD PRESSURE: 77 MMHG | BODY MASS INDEX: 39.27 KG/M2 | RESPIRATION RATE: 18 BRPM | HEART RATE: 63 BPM | SYSTOLIC BLOOD PRESSURE: 133 MMHG

## 2022-09-28 DIAGNOSIS — E78.5 HYPERLIPIDEMIA, UNSPECIFIED HYPERLIPIDEMIA TYPE: ICD-10-CM

## 2022-09-28 DIAGNOSIS — I10 HYPERTENSION, UNSPECIFIED TYPE: ICD-10-CM

## 2022-09-28 DIAGNOSIS — E11.9 TYPE 2 DIABETES MELLITUS WITHOUT COMPLICATION, WITH LONG-TERM CURRENT USE OF INSULIN: Primary | ICD-10-CM

## 2022-09-28 DIAGNOSIS — E66.9 OBESITY (BMI 30-39.9): ICD-10-CM

## 2022-09-28 DIAGNOSIS — D72.829 LEUKOCYTOSIS, UNSPECIFIED TYPE: ICD-10-CM

## 2022-09-28 DIAGNOSIS — M10.9 GOUT OF RIGHT FOOT, UNSPECIFIED CAUSE, UNSPECIFIED CHRONICITY: ICD-10-CM

## 2022-09-28 DIAGNOSIS — Z79.4 TYPE 2 DIABETES MELLITUS WITHOUT COMPLICATION, WITH LONG-TERM CURRENT USE OF INSULIN: Primary | ICD-10-CM

## 2022-09-28 LAB — HBA1C MFR BLD: 8.6 %

## 2022-09-28 PROCEDURE — 99215 OFFICE O/P EST HI 40 MIN: CPT | Mod: PBBFAC

## 2022-09-28 PROCEDURE — 83036 HEMOGLOBIN GLYCOSYLATED A1C: CPT | Mod: PBBFAC

## 2022-09-28 RX ORDER — VIT C/E/ZN/COPPR/LUTEIN/ZEAXAN 250MG-90MG
1000 CAPSULE ORAL DAILY
Qty: 30 CAPSULE | Refills: 3 | Status: SHIPPED | OUTPATIENT
Start: 2022-09-28 | End: 2023-03-30 | Stop reason: SDUPTHER

## 2022-09-28 RX ORDER — SEMAGLUTIDE 1.34 MG/ML
0.25 INJECTION, SOLUTION SUBCUTANEOUS
Qty: 1 PEN | Refills: 2 | Status: SHIPPED | OUTPATIENT
Start: 2022-09-28 | End: 2022-11-19 | Stop reason: SDUPTHER

## 2022-09-28 NOTE — PROGRESS NOTES
Subjective:       Patient ID: Jill Dey is a 57 y.o. female.    Chief Complaint: No chief complaint on file.    Patient is a 57-year-old  female with past medical history of hypertension, hyperlipidemia, type 2 diabetes, vitamin-D deficiency, and obesity who presents to Internal Medicine Clinic for scheduled follow-up.  She states she has had a rough last few days because her son has been arrested and threatening suicide, she states she has not slept for the last 3 nights.  Otherwise patient states she is doing well at home, she is attempting to work on diet and increasing activity.  She states she expects her sugar control to be less than ideal this time around.  Patient has not been checking sugars at home, daughter previously helped down with blood pressures in sugars has full-time job and is not able to do it as much anymore.  Patient continues to have some pain down her left leg that is associated with a fall approximately 3 weeks ago, she takes Tylenol as needed for this and this helps.  Patient is down 65 lb total, attempting to lose another 30 lb.  Patient states she is doing well with all of her medicines, no other acute complaints today    Review of Systems   Constitutional:  Negative for activity change and unexpected weight change.   HENT:  Negative for hearing loss, rhinorrhea and trouble swallowing.    Eyes:  Negative for discharge and visual disturbance.   Respiratory:  Negative for chest tightness and wheezing.    Cardiovascular:  Negative for chest pain and palpitations.   Gastrointestinal:  Negative for blood in stool, constipation, diarrhea and vomiting.   Endocrine: Negative for polydipsia.   Genitourinary:  Negative for difficulty urinating, dysuria, hematuria and menstrual problem.   Musculoskeletal:  Positive for arthralgias. Negative for joint swelling and neck pain.   Neurological:  Negative for weakness and headaches.   Psychiatric/Behavioral:  Negative for confusion  and dysphoric mood.        Objective:      Physical Exam  Constitutional:       Appearance: Normal appearance. She is obese.   HENT:      Head: Normocephalic and atraumatic.   Cardiovascular:      Rate and Rhythm: Normal rate and regular rhythm.      Pulses: Normal pulses.      Heart sounds: Normal heart sounds.   Pulmonary:      Effort: Pulmonary effort is normal.      Breath sounds: Normal breath sounds.   Abdominal:      General: Abdomen is flat.      Palpations: Abdomen is soft.   Musculoskeletal:         General: Normal range of motion.      Comments: Tenderness along length of left leg   Skin:     General: Skin is warm and dry.   Neurological:      General: No focal deficit present.      Mental Status: She is alert and oriented to person, place, and time.       Assessment:       Problem List Items Addressed This Visit          Cardiac/Vascular    Hypertension    Hyperlipidemia       Oncology    Leukocytosis       Endocrine    Type 2 diabetes mellitus - Primary    Relevant Medications    semaglutide (OZEMPIC) 0.25 mg or 0.5 mg(2 mg/1.5 mL) pen injector    Other Relevant Orders    Hemoglobin A1C, POCT (Completed)    Ambulatory referral/consult to Podiatry    Obesity (BMI 30-39.9)       Orthopedic    Gout         Plan:       Type 2 diabetes, insulin dependent   -patient currently taking 16 units of Lantus daily as well as glipizide 10 mg metformin 1000 mg   -POCT A1c 8.6 today up from 8.1 previously and 7.2 before that  -patient not consistently checking blood sugars at home, told patient to check 3 times a day for the next week and bring log to clinic  -patient has lost a lot of weight but has reached a plateau, looking to lose more, will add Ozempic 0.25 mg weekly for help with diabetic control as well as weight  -continue gabapentin 300 mg for associated neuropathy  -will refer patient for podiatry, hoping to get set up with diabetic shoes as well as other foot care  -will make further changes when patient  brings log of sugars next week    Hypertension   -continue amlodipine 10 mg daily, lisinopril HCTZ 20-25 mg daily  -blood pressure in good range today, patient states it is never high at home    Vitamin-D deficiency   -confirmed deficiency on prior lab checks, patient has been taking over-the-counter supplements   -will prescribe cholecalciferol 1000 units daily and recheck level in next visit    Hyperlipidemia   -continue Lipitor 40 mg daily and Zetia 10 mg daily   -FLP at next visit    Samuel Hendrix DO  [employer] [facility] PGY2

## 2022-10-11 ENCOUNTER — TELEPHONE (OUTPATIENT)
Dept: INTERNAL MEDICINE | Facility: CLINIC | Age: 57
End: 2022-10-11

## 2022-10-18 DIAGNOSIS — Z12.31 BREAST CANCER SCREENING BY MAMMOGRAM: Primary | ICD-10-CM

## 2022-10-26 ENCOUNTER — TELEPHONE (OUTPATIENT)
Dept: INTERNAL MEDICINE | Facility: CLINIC | Age: 57
End: 2022-10-26

## 2022-10-26 NOTE — TELEPHONE ENCOUNTER
Pt has called multiple times inquiring about handicap papers that were supposed ton be ready 2 wks agao according to her. Please contact regarding this matter. Thanks.

## 2022-10-28 ENCOUNTER — HOSPITAL ENCOUNTER (OUTPATIENT)
Dept: RADIOLOGY | Facility: HOSPITAL | Age: 57
Discharge: HOME | End: 2022-10-28
Attending: STUDENT IN AN ORGANIZED HEALTH CARE EDUCATION/TRAINING PROGRAM
Payer: MEDICAID

## 2022-10-28 DIAGNOSIS — Z12.31 BREAST CANCER SCREENING BY MAMMOGRAM: ICD-10-CM

## 2022-10-28 PROCEDURE — 77067 SCR MAMMO BI INCL CAD: CPT | Mod: TC

## 2022-10-28 PROCEDURE — 77063 BREAST TOMOSYNTHESIS BI: CPT | Mod: 26,,, | Performed by: RADIOLOGY

## 2022-10-28 PROCEDURE — 77067 SCR MAMMO BI INCL CAD: CPT | Mod: 26,,, | Performed by: RADIOLOGY

## 2022-10-28 PROCEDURE — 77063 MAMMO DIGITAL SCREENING BILAT WITH TOMO: ICD-10-PCS | Mod: 26,,, | Performed by: RADIOLOGY

## 2022-10-28 PROCEDURE — 77067 MAMMO DIGITAL SCREENING BILAT WITH TOMO: ICD-10-PCS | Mod: 26,,, | Performed by: RADIOLOGY

## 2022-11-19 ENCOUNTER — OFFICE VISIT (OUTPATIENT)
Dept: INTERNAL MEDICINE | Facility: CLINIC | Age: 57
End: 2022-11-19
Payer: MEDICAID

## 2022-11-19 VITALS
HEIGHT: 64 IN | RESPIRATION RATE: 18 BRPM | OXYGEN SATURATION: 98 % | HEART RATE: 64 BPM | TEMPERATURE: 99 F | BODY MASS INDEX: 39.03 KG/M2 | SYSTOLIC BLOOD PRESSURE: 126 MMHG | WEIGHT: 228.63 LBS | DIASTOLIC BLOOD PRESSURE: 72 MMHG

## 2022-11-19 DIAGNOSIS — Z00.00 WELLNESS EXAMINATION: Primary | ICD-10-CM

## 2022-11-19 DIAGNOSIS — E11.9 TYPE 2 DIABETES MELLITUS WITHOUT COMPLICATION, WITH LONG-TERM CURRENT USE OF INSULIN: ICD-10-CM

## 2022-11-19 DIAGNOSIS — Z79.4 TYPE 2 DIABETES MELLITUS WITHOUT COMPLICATION, WITH LONG-TERM CURRENT USE OF INSULIN: ICD-10-CM

## 2022-11-19 PROCEDURE — 99215 OFFICE O/P EST HI 40 MIN: CPT | Mod: PBBFAC | Performed by: INTERNAL MEDICINE

## 2022-11-19 PROCEDURE — 90471 IMMUNIZATION ADMIN: CPT | Mod: PBBFAC

## 2022-11-19 RX ORDER — EZETIMIBE 10 MG/1
10 TABLET ORAL DAILY
Qty: 30 TABLET | Refills: 5 | Status: SHIPPED | OUTPATIENT
Start: 2022-11-19 | End: 2023-01-17 | Stop reason: SDUPTHER

## 2022-11-19 RX ORDER — SEMAGLUTIDE 1.34 MG/ML
0.25 INJECTION, SOLUTION SUBCUTANEOUS
Qty: 1 PEN | Refills: 2 | Status: SHIPPED | OUTPATIENT
Start: 2022-11-19 | End: 2023-01-17 | Stop reason: SDUPTHER

## 2022-11-19 NOTE — PROGRESS NOTES
"Jill Dey presented for a  Medicare AWV and comprehensive Health Risk Assessment today. The following components were reviewed and updated:    Medical history  Family History  Social history  Allergies and Current Medications  Health Risk Assessment  Health Maintenance  Care Team         ** See Completed Assessments for Annual Wellness Visit within the encounter summary.**         The following assessments were completed:  Living Situation  CAGE  Depression Screening  Timed Get Up and Go  Whisper Test  Cognitive Function Screening  Nutrition Screening  ADL Screening  PAQ Screening        Vitals:    11/19/22 0842   BP: 126/72   BP Location: Left arm   Patient Position: Sitting   BP Method: X-Large (Automatic)   Pulse: 64   Resp: 18   Temp: 98.6 °F (37 °C)   TempSrc: Oral   SpO2: 98%   Weight: 103.7 kg (228 lb 9.9 oz)   Height: 5' 4.02" (1.626 m)     Body mass index is 39.22 kg/m².  Physical Exam  Vitals and nursing note reviewed.   Constitutional:       General: She is not in acute distress.     Appearance: Normal appearance. She is not ill-appearing, toxic-appearing or diaphoretic.   HENT:      Head: Normocephalic and atraumatic.      Mouth/Throat:      Mouth: Mucous membranes are moist.      Pharynx: Oropharynx is clear.   Eyes:      Extraocular Movements: Extraocular movements intact.      Conjunctiva/sclera: Conjunctivae normal.      Pupils: Pupils are equal, round, and reactive to light.   Cardiovascular:      Rate and Rhythm: Normal rate and regular rhythm.      Pulses: Normal pulses.      Heart sounds: Normal heart sounds. No murmur heard.    No gallop.   Pulmonary:      Effort: Pulmonary effort is normal. No respiratory distress.      Breath sounds: Normal breath sounds. No stridor. No wheezing, rhonchi or rales.   Abdominal:      General: Bowel sounds are normal.      Palpations: Abdomen is soft.   Feet:      Comments: Diabetic Foot Exam:   - Current Complaints:  None  - Nails: Normal Appearing  - " Foot Deformities:  None    - Amputations:  None    - Pedal Pulses: +2 bilaterally    - Dorsalis pedis: +2 bilaterally     - Posterior tibial: +2 bilaterally  - Skin Condition: Warm and dry bilaterally  - Sensory Exam: Absent in bilateral heels otherwise intact  - Risk: None  - Footwear: Diabetic Shoes  - Education: DM foot care education provided and instructed patient to avoid going outside barefooted   Skin:     General: Skin is warm and dry.   Neurological:      General: No focal deficit present.      Mental Status: She is alert and oriented to person, place, and time.   Psychiatric:         Mood and Affect: Mood normal.         Behavior: Behavior normal.           Diagnoses and health risks identified today and associated recommendations/orders:    This is a 57-year-old  female with past medical history of hypertension, hyperlipidemia, type 2 diabetes mellitus, vitamin-D deficiency, and obesity who presents to clinic for a wellness visit.    Needs pneumococcal vaccine - PCV 20, she will talk with her PCP  Needs influenza vaccine - done today  Diabetes urine screening  - ordered today  Diabetic foot exam done today  Last A1c 8.6 09/28/2022  Mammogram 10/29/2022  Colon cancer screening last on 08/20/2020 - colonoscopy  Up-to-date on hepatitis and HIV screening  Greater than 20 year pack year history with current tobacco use.  Will get low-dose CT screening    Provided Jill with a 5-10 year written screening schedule and personal prevention plan. Recommendations were developed using the USPSTF age appropriate recommendations. Education, counseling, and referrals were provided as needed. After Visit Summary printed and given to patient which includes a list of additional screenings\tests needed.    Follow up in about 1 year (around 11/19/2023).    Gopi Morales DO

## 2022-12-05 ENCOUNTER — HOSPITAL ENCOUNTER (OUTPATIENT)
Dept: RADIOLOGY | Facility: HOSPITAL | Age: 57
Discharge: HOME | End: 2022-12-05
Attending: INTERNAL MEDICINE
Payer: MEDICAID

## 2022-12-05 DIAGNOSIS — Z00.00 WELLNESS EXAMINATION: ICD-10-CM

## 2022-12-05 PROCEDURE — 71271 CT THORAX LUNG CANCER SCR C-: CPT | Mod: TC

## 2023-01-17 ENCOUNTER — OFFICE VISIT (OUTPATIENT)
Dept: INTERNAL MEDICINE | Facility: CLINIC | Age: 58
End: 2023-01-17
Payer: MEDICAID

## 2023-01-17 VITALS
WEIGHT: 221.31 LBS | HEIGHT: 64 IN | DIASTOLIC BLOOD PRESSURE: 64 MMHG | OXYGEN SATURATION: 98 % | BODY MASS INDEX: 37.78 KG/M2 | RESPIRATION RATE: 20 BRPM | HEART RATE: 68 BPM | SYSTOLIC BLOOD PRESSURE: 124 MMHG | TEMPERATURE: 99 F

## 2023-01-17 DIAGNOSIS — E66.9 OBESITY (BMI 30-39.9): ICD-10-CM

## 2023-01-17 DIAGNOSIS — E11.9 TYPE 2 DIABETES MELLITUS WITHOUT COMPLICATION, WITH LONG-TERM CURRENT USE OF INSULIN: ICD-10-CM

## 2023-01-17 DIAGNOSIS — Z79.4 TYPE 2 DIABETES MELLITUS WITHOUT COMPLICATION, WITH LONG-TERM CURRENT USE OF INSULIN: ICD-10-CM

## 2023-01-17 DIAGNOSIS — E78.5 HYPERLIPIDEMIA, UNSPECIFIED HYPERLIPIDEMIA TYPE: ICD-10-CM

## 2023-01-17 DIAGNOSIS — I10 HYPERTENSION, UNSPECIFIED TYPE: ICD-10-CM

## 2023-01-17 DIAGNOSIS — E11.9 TYPE 2 DIABETES MELLITUS WITHOUT COMPLICATION, WITHOUT LONG-TERM CURRENT USE OF INSULIN: Primary | ICD-10-CM

## 2023-01-17 LAB
CREAT UR-MCNC: >400 MG/DL (ref 47–110)
MICROALBUMIN UR-MCNC: 77.2 UG/ML
MICROALBUMIN/CREAT RATIO PNL UR: <19.3 MG/GM CR (ref 0–30)

## 2023-01-17 PROCEDURE — 82043 UR ALBUMIN QUANTITATIVE: CPT

## 2023-01-17 PROCEDURE — 99214 OFFICE O/P EST MOD 30 MIN: CPT | Mod: PBBFAC

## 2023-01-17 RX ORDER — METFORMIN HYDROCHLORIDE 1000 MG/1
1000 TABLET ORAL 2 TIMES DAILY WITH MEALS
Qty: 60 TABLET | Refills: 5 | Status: SHIPPED | OUTPATIENT
Start: 2023-01-17 | End: 2023-04-11 | Stop reason: SDUPTHER

## 2023-01-17 RX ORDER — EZETIMIBE 10 MG/1
10 TABLET ORAL DAILY
Qty: 30 TABLET | Refills: 5 | Status: SHIPPED | OUTPATIENT
Start: 2023-01-17 | End: 2023-04-11

## 2023-01-17 RX ORDER — ATORVASTATIN CALCIUM 40 MG/1
40 TABLET, FILM COATED ORAL DAILY
Qty: 30 TABLET | Refills: 5 | Status: SHIPPED | OUTPATIENT
Start: 2023-01-17 | End: 2023-04-11 | Stop reason: SDUPTHER

## 2023-01-17 RX ORDER — LISINOPRIL AND HYDROCHLOROTHIAZIDE 20; 25 MG/1; MG/1
1 TABLET ORAL DAILY
Qty: 30 TABLET | Refills: 5 | Status: SHIPPED | OUTPATIENT
Start: 2023-01-17 | End: 2023-04-11 | Stop reason: SDUPTHER

## 2023-01-17 RX ORDER — SEMAGLUTIDE 1.34 MG/ML
0.25 INJECTION, SOLUTION SUBCUTANEOUS
Qty: 1 PEN | Refills: 2 | Status: SHIPPED | OUTPATIENT
Start: 2023-01-17 | End: 2023-04-11 | Stop reason: SDUPTHER

## 2023-01-17 RX ORDER — GABAPENTIN 300 MG/1
300 CAPSULE ORAL 3 TIMES DAILY
Qty: 90 CAPSULE | Refills: 5 | Status: SHIPPED | OUTPATIENT
Start: 2023-01-17 | End: 2023-04-11 | Stop reason: SDUPTHER

## 2023-01-17 RX ORDER — ASPIRIN 81 MG/1
81 TABLET ORAL DAILY
Qty: 30 TABLET | Refills: 3 | Status: SHIPPED | OUTPATIENT
Start: 2023-01-17 | End: 2023-04-11 | Stop reason: SDUPTHER

## 2023-01-17 RX ORDER — INSULIN GLARGINE 100 [IU]/ML
INJECTION, SOLUTION SUBCUTANEOUS
Qty: 3 ML | Refills: 3 | Status: SHIPPED | OUTPATIENT
Start: 2023-01-17 | End: 2023-04-11 | Stop reason: SDUPTHER

## 2023-01-17 RX ORDER — AMLODIPINE BESYLATE 10 MG/1
10 TABLET ORAL DAILY
Qty: 30 TABLET | Refills: 5 | Status: SHIPPED | OUTPATIENT
Start: 2023-01-17 | End: 2023-04-11 | Stop reason: SDUPTHER

## 2023-01-17 RX ORDER — SEMAGLUTIDE 1.34 MG/ML
0.25 INJECTION, SOLUTION SUBCUTANEOUS
Qty: 1 PEN | Refills: 2 | Status: SHIPPED | OUTPATIENT
Start: 2023-01-17 | End: 2023-01-17 | Stop reason: SDUPTHER

## 2023-01-17 RX ORDER — GLIPIZIDE 10 MG/1
10 TABLET ORAL DAILY
Qty: 30 TABLET | Refills: 5 | Status: SHIPPED | OUTPATIENT
Start: 2023-01-17 | End: 2023-04-11 | Stop reason: SDUPTHER

## 2023-01-17 NOTE — PROGRESS NOTES
Subjective:       Patient ID: Jill Dey is a 57 y.o. female.    Chief Complaint: Follow-up (4 day history  of nausea and diarrhea   hasn't been eating for last few days )    Patient is a 57-year-old  female with past medical history of hypertension, hyperlipidemia, type 2 diabetes, diabetic neuropathy who presents to Internal Medicine Clinic for scheduled follow-up.  She states she is been feeling very well since her last visit, recently started on Ozempic and patient has been losing weight.  She has also maintained can diet and states she has been trying to stay more active at home.  Patient recently has had viral gastroenteritis, has been vomiting and said diarrhea for the last 2 days.  She feels she is slowly improving but has still not had much oral intake.  Patient also with right wrist pain for the last 2 weeks, she has been wearing a brace and she states this helps the pain.  No new chest pain, no shortness of breath, no other acute complaints at this time    Review of Systems   Constitutional:  Negative for activity change, chills and fever.   Respiratory:  Negative for shortness of breath.    Cardiovascular:  Negative for chest pain and leg swelling.   Gastrointestinal:  Positive for diarrhea, nausea and vomiting. Negative for abdominal pain.   Genitourinary:  Negative for dysuria.   Musculoskeletal:  Negative for arthralgias.   Neurological:  Negative for weakness and headaches.       Objective:      Physical Exam  Constitutional:       Appearance: Normal appearance.   HENT:      Head: Normocephalic and atraumatic.      Mouth/Throat:      Mouth: Mucous membranes are dry.   Cardiovascular:      Rate and Rhythm: Normal rate and regular rhythm.      Pulses: Normal pulses.      Heart sounds: Normal heart sounds.   Pulmonary:      Effort: Pulmonary effort is normal.      Breath sounds: Normal breath sounds.   Abdominal:      Palpations: Abdomen is soft.      Tenderness: There is  abdominal tenderness.   Musculoskeletal:      Comments: Tenderness to right lateral wrist   Neurological:      Mental Status: She is alert.       Assessment:       Problem List Items Addressed This Visit          Cardiac/Vascular    Hypertension    Hyperlipidemia       Endocrine    Type 2 diabetes mellitus - Primary    Relevant Medications    glipiZIDE (GLUCOTROL) 10 MG tablet    LANTUS SOLOSTAR U-100 INSULIN glargine 100 units/mL SubQ pen    metFORMIN (GLUCOPHAGE) 1000 MG tablet    semaglutide (OZEMPIC) 0.25 mg or 0.5 mg(2 mg/1.5 mL) pen injector    Other Relevant Orders    Lipid Panel (Completed)    Microalbumin/Creatinine Ratio, Urine (Completed)    Hemoglobin A1C (Completed)    Comprehensive Metabolic Panel (Completed)    Vitamin D (Completed)    Obesity (BMI 30-39.9)         Plan:       Type 2 diabetes, insulin dependent   -patient currently taking 16 units of Lantus daily as well as glipizide 10 mg metformin 1000 mg, ozempic .25mg weekly  -POCT A1c 7.6 today, down  from 8.6 previously  -continue gabapentin 300 mg for associated neuropathy  -will refer patient for podiatry, hoping to get set up with diabetic shoes as well as other foot care     Hypertension   -continue amlodipine 10 mg daily, lisinopril HCTZ 20-25 mg daily  -blood pressure in good range today, patient states it is never high at home     Vitamin-D deficiency   -confirmed deficiency on prior lab checks, patient has been taking over-the-counter supplements   -continue cholecalciferol 1000 units daily     Hyperlipidemia   -continue Lipitor 40 mg daily and Zetia 10 mg daily   -FLP today      Samuel Hendrix DO  [facility] [facility] PGY2

## 2023-02-01 ENCOUNTER — CLINICAL SUPPORT (OUTPATIENT)
Dept: SMOKING CESSATION | Facility: CLINIC | Age: 58
End: 2023-02-01
Payer: COMMERCIAL

## 2023-02-01 DIAGNOSIS — F17.200 NICOTINE DEPENDENCE: Primary | ICD-10-CM

## 2023-02-01 PROCEDURE — 99404 PR PREVENT COUNSEL,INDIV,60 MIN: ICD-10-PCS | Mod: S$GLB,,,

## 2023-02-01 PROCEDURE — 99404 PREV MED CNSL INDIV APPRX 60: CPT | Mod: S$GLB,,,

## 2023-02-01 RX ORDER — IBUPROFEN 200 MG
1 TABLET ORAL DAILY
Qty: 28 PATCH | Refills: 0 | Status: SHIPPED | OUTPATIENT
Start: 2023-02-01 | End: 2023-04-11 | Stop reason: SDUPTHER

## 2023-02-01 RX ORDER — MICONAZOLE NITRATE 2 %
2 CREAM (GRAM) TOPICAL
Qty: 100 EACH | Refills: 0 | Status: SHIPPED | OUTPATIENT
Start: 2023-02-01 | End: 2023-04-11 | Stop reason: SDUPTHER

## 2023-02-01 NOTE — PROGRESS NOTES
Patient will be participating in biweekly tobacco cessation meetings and will begin the prescribed tobacco cessation medication regimen of 21 mg nicotine patch QD and 2 mg nicotine gum PRN (1-2 per hour in place of cigarettes). Patient currently smokes 9-10 cigarettes per day.  Discussed the role of tobacco cessation program, role of nicotine replacement therapy (NRT) and behavioral changes to assist the patient to reach her goal of being tobacco free. Education and instruction on the role of the NRT, usage and proper placement of the patch and gum. Patient verbalized understanding and willingness to use the patch and gum.  Pt started on rate reduction and wait time of 15 min prior to smoking. Pt's exhaled carbon monoxide level was 38 ppm as per [employer]. (non- smoker = 0-5 ppm.)

## 2023-02-17 ENCOUNTER — HOSPITAL ENCOUNTER (EMERGENCY)
Facility: HOSPITAL | Age: 58
Discharge: HOME | End: 2023-02-17
Attending: EMERGENCY MEDICINE
Payer: MEDICAID

## 2023-02-17 VITALS
DIASTOLIC BLOOD PRESSURE: 88 MMHG | HEART RATE: 72 BPM | RESPIRATION RATE: 16 BRPM | HEIGHT: 64 IN | TEMPERATURE: 99 F | SYSTOLIC BLOOD PRESSURE: 155 MMHG | OXYGEN SATURATION: 99 % | WEIGHT: 226.06 LBS | BODY MASS INDEX: 38.59 KG/M2

## 2023-02-17 DIAGNOSIS — M54.41 ACUTE BILATERAL LOW BACK PAIN WITH BILATERAL SCIATICA: Primary | ICD-10-CM

## 2023-02-17 DIAGNOSIS — M54.42 ACUTE BILATERAL LOW BACK PAIN WITH BILATERAL SCIATICA: Primary | ICD-10-CM

## 2023-02-17 PROCEDURE — 63600175 PHARM REV CODE 636 W HCPCS: Performed by: NURSE PRACTITIONER

## 2023-02-17 PROCEDURE — 96372 THER/PROPH/DIAG INJ SC/IM: CPT | Performed by: NURSE PRACTITIONER

## 2023-02-17 PROCEDURE — 99284 EMERGENCY DEPT VISIT MOD MDM: CPT

## 2023-02-17 RX ORDER — TIZANIDINE 4 MG/1
4 TABLET ORAL EVERY 6 HOURS PRN
Qty: 20 TABLET | Refills: 0 | Status: SHIPPED | OUTPATIENT
Start: 2023-02-17 | End: 2024-03-11 | Stop reason: ALTCHOICE

## 2023-02-17 RX ORDER — DICLOFENAC SODIUM 75 MG/1
75 TABLET, DELAYED RELEASE ORAL 2 TIMES DAILY PRN
Qty: 20 TABLET | Refills: 0 | Status: SHIPPED | OUTPATIENT
Start: 2023-02-17 | End: 2023-04-11

## 2023-02-17 RX ORDER — KETOROLAC TROMETHAMINE 30 MG/ML
30 INJECTION, SOLUTION INTRAMUSCULAR; INTRAVENOUS
Status: COMPLETED | OUTPATIENT
Start: 2023-02-17 | End: 2023-02-17

## 2023-02-17 RX ADMIN — KETOROLAC TROMETHAMINE 30 MG: 30 INJECTION, SOLUTION INTRAMUSCULAR; INTRAVENOUS at 07:02

## 2023-02-18 NOTE — ED PROVIDER NOTES
Encounter Date: 2/17/2023       History     Chief Complaint   Patient presents with    Back Pain     Bilat leg pain that started Sunday.  Increase in supine pos.  No hx of trauma      The patient presents with bilateral low back pain.  The onset was 5 days.  The course/duration of symptoms is constant.  Type of injury: none and none recent, denies falls.  Location: bilateral lumbar. Radiating pain: bilateral lower extremities. The character of symptoms is dull.  The degree at onset was moderate.  The degree at present is moderate.  There are exacerbating factors including movement and changing position.  The relieving factor is rest.  Risk factors consist of none.  Prior episodes: occasional.  Therapy today: none.  Associated symptoms: none, denies bowel dysfunction, denies bladder dysfunction, denies altered sensation, denies focal weakness, denies saddle numbness, denies abdominal pain and denies dysuria.        Review of patient's allergies indicates:  No Known Allergies  Past Medical History:   Diagnosis Date    Asthma     Diabetes mellitus, type 2     Hyperlipidemia     Hypertension      Past Surgical History:   Procedure Laterality Date    COLONOSCOPY  08/20/2020    Dr. Arnaud Lim    HYSTERECTOMY       History reviewed. No pertinent family history.  Social History     Tobacco Use    Smoking status: Every Day     Packs/day: 0.50     Types: Cigarettes    Smokeless tobacco: Never     Review of Systems   Constitutional:  Negative for fever.   HENT:  Negative for sore throat.    Respiratory:  Negative for shortness of breath.    Cardiovascular:  Negative for chest pain.   Gastrointestinal:  Negative for nausea.   Genitourinary:  Negative for dysuria.   Musculoskeletal:  Positive for back pain.   Skin:  Negative for rash.   Neurological:  Negative for weakness.   Hematological:  Does not bruise/bleed easily.   All other systems reviewed and are negative.    Physical Exam     Initial Vitals [02/17/23 1829]    BP Pulse Resp Temp SpO2   (!) 157/68 68 16 99 °F (37.2 °C) 99 %      MAP       --         Physical Exam    Nursing note and vitals reviewed.  Constitutional: She appears well-developed and well-nourished.   HENT:   Head: Normocephalic and atraumatic.   Neck: Neck supple.   Normal range of motion.  Cardiovascular:  Normal rate, regular rhythm, normal heart sounds and intact distal pulses.           Pulmonary/Chest: Breath sounds normal.   Abdominal: Abdomen is soft. Bowel sounds are normal.   Musculoskeletal:         General: Normal range of motion.      Cervical back: Normal range of motion and neck supple.      Comments: No costovertebral angle tenderness, , Thoracic: no vertebral point tenderness, Lumbar: ateral mild tenderness, midline negative, no vertebral point tenderness, Sacral: no vertebral point tenderness, Testing: Straight leg raising, supine negative.  No C/T/L point tenderness. normal reflexes.       Neurological: She is alert. She has normal strength.   Skin: Skin is warm and dry.   Psychiatric: She has a normal mood and affect.       ED Course   Procedures  Labs Reviewed - No data to display       Imaging Results              X-Ray Lumbar Spine 2 Or 3 Views (Final result)  Result time 02/17/23 19:07:52      Final result by Sabino Galdamez MD (02/17/23 19:07:52)                   Impression:      Mild degenerative changes with no acute fracture.      Electronically signed by: Sabino Galdamez  Date:    02/17/2023  Time:    19:07               Narrative:    EXAMINATION:  XR LUMBAR SPINE 2 OR 3 VIEWS    CLINICAL HISTORY:  low back pain;    TECHNIQUE:  Three views of the lumbar spine.    COMPARISON:  None    FINDINGS:  No displaced fracture.  Atherosclerotic disease of the abdominal aorta is noted.  Vertebral body height is well maintained.  Degenerative changes with mild anterolisthesis of L4 on L5.                                       Medications   ketorolac injection 30 mg (has no administration in  time range)     Medical Decision Making:   History:   Old Records Summarized: records from clinic visits and records from previous admission(s).  Clinical Tests:   Radiological Study: Ordered and Reviewed  7:21 PM DISPOSITION: The patient is resting comfortably in no acute distress.  She is hemodynamically stable and is without objective evidence for acute process requiring urgent intervention or hospitalization. I provided counseling to patient with regard to condition, the treatment plan, specific conditions for return, and the importance of follow up. Detailed written and verbal instructions provided to patient and she expressed a verbal understanding of the discharge instructions and overall management plan. Reiterated the importance of medication administration and safety and advised patient to follow up with primary care provider in 3-5 days or sooner if needed.  Answered questions at this time. The patient is stable for discharge.                           Clinical Impression:   Final diagnoses:  [M54.42, M54.41] Acute bilateral low back pain with bilateral sciatica (Primary)        ED Disposition Condition    Discharge Stable          ED Prescriptions       Medication Sig Dispense Start Date End Date Auth. Provider    diclofenac (VOLTAREN) 75 MG EC tablet Take 1 tablet (75 mg total) by mouth 2 (two) times daily as needed (pain). 20 tablet 2/17/2023 -- RADHA Pisano    tiZANidine (ZANAFLEX) 4 MG tablet Take 1 tablet (4 mg total) by mouth every 6 (six) hours as needed (pain or spasms). May cause drowsiness 20 tablet 2/17/2023 -- RADHA Pisano          Follow-up Information       Follow up With Specialties Details Why Contact Info    Samuel Hendrix, DO Internal Medicine In 3 days  [address] [facility] [address]  [phone]      [facility] Emergency Medicine  If symptoms worsen [address] [facility] [address] [phone]             Marcell Dixon  [facility]  02/17/23 1922

## 2023-02-23 ENCOUNTER — TELEPHONE (OUTPATIENT)
Dept: SMOKING CESSATION | Facility: CLINIC | Age: 58
End: 2023-02-23
Payer: MEDICAID

## 2023-02-23 NOTE — TELEPHONE ENCOUNTER
Pt had not shown up for her follow up appt.  Called pt.  No answer.  Left voice message with contact information.

## 2023-03-06 ENCOUNTER — TELEPHONE (OUTPATIENT)
Dept: SMOKING CESSATION | Facility: CLINIC | Age: 58
End: 2023-03-06
Payer: MEDICAID

## 2023-03-06 NOTE — TELEPHONE ENCOUNTER
Attempted to contact pt regarding missed follow up appt.  Called pt.  No answer.  Left voice message with contact information.

## 2023-03-30 RX ORDER — VIT C/E/ZN/COPPR/LUTEIN/ZEAXAN 250MG-90MG
1000 CAPSULE ORAL DAILY
Qty: 30 CAPSULE | Refills: 3 | Status: SHIPPED | OUTPATIENT
Start: 2023-03-30 | End: 2023-04-11 | Stop reason: SDUPTHER

## 2023-04-10 DIAGNOSIS — M10.9 GOUT, UNSPECIFIED CAUSE, UNSPECIFIED CHRONICITY, UNSPECIFIED SITE: Primary | ICD-10-CM

## 2023-04-10 RX ORDER — GABAPENTIN 300 MG/1
300 CAPSULE ORAL 3 TIMES DAILY
Qty: 90 CAPSULE | Refills: 5 | Status: CANCELLED | OUTPATIENT
Start: 2023-04-10 | End: 2023-10-07

## 2023-04-11 ENCOUNTER — OFFICE VISIT (OUTPATIENT)
Dept: INTERNAL MEDICINE | Facility: CLINIC | Age: 58
End: 2023-04-11
Payer: MEDICAID

## 2023-04-11 VITALS
RESPIRATION RATE: 18 BRPM | WEIGHT: 223 LBS | HEIGHT: 64 IN | SYSTOLIC BLOOD PRESSURE: 109 MMHG | TEMPERATURE: 98 F | BODY MASS INDEX: 38.07 KG/M2 | DIASTOLIC BLOOD PRESSURE: 68 MMHG | HEART RATE: 64 BPM | OXYGEN SATURATION: 99 %

## 2023-04-11 DIAGNOSIS — F17.200 NICOTINE DEPENDENCE: ICD-10-CM

## 2023-04-11 DIAGNOSIS — E55.9 VITAMIN D DEFICIENCY: ICD-10-CM

## 2023-04-11 DIAGNOSIS — Z79.4 TYPE 2 DIABETES MELLITUS WITHOUT COMPLICATION, WITH LONG-TERM CURRENT USE OF INSULIN: Primary | ICD-10-CM

## 2023-04-11 DIAGNOSIS — E66.9 OBESITY (BMI 30-39.9): ICD-10-CM

## 2023-04-11 DIAGNOSIS — M25.531 RIGHT WRIST PAIN: ICD-10-CM

## 2023-04-11 DIAGNOSIS — E78.5 HYPERLIPIDEMIA, UNSPECIFIED HYPERLIPIDEMIA TYPE: ICD-10-CM

## 2023-04-11 DIAGNOSIS — I10 HYPERTENSION, UNSPECIFIED TYPE: ICD-10-CM

## 2023-04-11 DIAGNOSIS — M25.539 PAIN IN WRIST, UNSPECIFIED LATERALITY: ICD-10-CM

## 2023-04-11 DIAGNOSIS — E11.9 TYPE 2 DIABETES MELLITUS WITHOUT COMPLICATION, WITH LONG-TERM CURRENT USE OF INSULIN: Primary | ICD-10-CM

## 2023-04-11 LAB — HBA1C MFR BLD: 8.5 %

## 2023-04-11 PROCEDURE — 83036 HEMOGLOBIN GLYCOSYLATED A1C: CPT | Mod: PBBFAC

## 2023-04-11 PROCEDURE — 99214 OFFICE O/P EST MOD 30 MIN: CPT | Mod: PBBFAC

## 2023-04-11 RX ORDER — LISINOPRIL AND HYDROCHLOROTHIAZIDE 20; 25 MG/1; MG/1
1 TABLET ORAL DAILY
Qty: 30 TABLET | Refills: 5 | Status: SHIPPED | OUTPATIENT
Start: 2023-04-11 | End: 2023-08-03 | Stop reason: SDUPTHER

## 2023-04-11 RX ORDER — METFORMIN HYDROCHLORIDE 1000 MG/1
1000 TABLET ORAL 2 TIMES DAILY WITH MEALS
Qty: 60 TABLET | Refills: 5 | Status: SHIPPED | OUTPATIENT
Start: 2023-04-11 | End: 2023-08-03 | Stop reason: SDUPTHER

## 2023-04-11 RX ORDER — INSULIN GLARGINE 100 [IU]/ML
INJECTION, SOLUTION SUBCUTANEOUS
Qty: 3 ML | Refills: 3 | Status: SHIPPED | OUTPATIENT
Start: 2023-04-11 | End: 2023-08-03 | Stop reason: SDUPTHER

## 2023-04-11 RX ORDER — VIT C/E/ZN/COPPR/LUTEIN/ZEAXAN 250MG-90MG
1000 CAPSULE ORAL DAILY
Qty: 30 CAPSULE | Refills: 5 | Status: SHIPPED | OUTPATIENT
Start: 2023-04-11 | End: 2023-08-03 | Stop reason: SDUPTHER

## 2023-04-11 RX ORDER — SEMAGLUTIDE 1.34 MG/ML
0.5 INJECTION, SOLUTION SUBCUTANEOUS
Qty: 1 EACH | Refills: 2 | Status: SHIPPED | OUTPATIENT
Start: 2023-04-11 | End: 2023-07-04

## 2023-04-11 RX ORDER — GLIPIZIDE 10 MG/1
10 TABLET ORAL DAILY
Qty: 30 TABLET | Refills: 5 | Status: SHIPPED | OUTPATIENT
Start: 2023-04-11 | End: 2023-08-03 | Stop reason: SDUPTHER

## 2023-04-11 RX ORDER — AMLODIPINE BESYLATE 10 MG/1
10 TABLET ORAL DAILY
Qty: 30 TABLET | Refills: 5 | Status: SHIPPED | OUTPATIENT
Start: 2023-04-11 | End: 2023-08-03 | Stop reason: SDUPTHER

## 2023-04-11 RX ORDER — ASPIRIN 81 MG/1
81 TABLET ORAL DAILY
Qty: 30 TABLET | Refills: 5 | Status: SHIPPED | OUTPATIENT
Start: 2023-04-11 | End: 2023-08-03 | Stop reason: SDUPTHER

## 2023-04-11 RX ORDER — ATORVASTATIN CALCIUM 40 MG/1
40 TABLET, FILM COATED ORAL DAILY
Qty: 30 TABLET | Refills: 5 | Status: SHIPPED | OUTPATIENT
Start: 2023-04-11 | End: 2023-08-03 | Stop reason: SDUPTHER

## 2023-04-11 RX ORDER — GABAPENTIN 300 MG/1
300 CAPSULE ORAL 3 TIMES DAILY
Qty: 90 CAPSULE | Refills: 5 | OUTPATIENT
Start: 2023-04-11 | End: 2023-10-08

## 2023-04-11 RX ORDER — IBUPROFEN 200 MG
1 TABLET ORAL DAILY
Qty: 28 PATCH | Refills: 0 | Status: SHIPPED | OUTPATIENT
Start: 2023-04-11 | End: 2023-08-03

## 2023-04-11 RX ORDER — SEMAGLUTIDE 1.34 MG/ML
0.25 INJECTION, SOLUTION SUBCUTANEOUS
Qty: 1 EACH | Refills: 2 | Status: SHIPPED | OUTPATIENT
Start: 2023-04-11 | End: 2023-04-11

## 2023-04-11 RX ORDER — MICONAZOLE NITRATE 2 %
2 CREAM (GRAM) TOPICAL
Qty: 100 EACH | Refills: 0 | Status: SHIPPED | OUTPATIENT
Start: 2023-04-11 | End: 2023-08-03

## 2023-04-11 RX ORDER — GABAPENTIN 300 MG/1
300 CAPSULE ORAL 3 TIMES DAILY
Qty: 90 CAPSULE | Refills: 5 | Status: SHIPPED | OUTPATIENT
Start: 2023-04-11 | End: 2023-08-03 | Stop reason: SDUPTHER

## 2023-04-11 NOTE — PROGRESS NOTES
Subjective     Patient ID: Jill Dey is a 58 y.o. female.    Chief Complaint: Follow-up    Patient is a 57-year-old  female with past medical history of hypertension, hyperlipidemia, type 2 diabetes, diabetic neuropathy who presents to Internal Medicine Clinic for scheduled follow-up.  She states she has been doing well since her last visit.  She continues to have pain in her right wrist, she notices it every day and is worsened with exertion.  Very tender to touch in range of motion is limited, no evidence of prior trauma or other insult to area.  She has been wearing a splint for 8 weeks with no improvement.  She states she tries to watch what she eats but A1c has continued to increase, is not familiar with diabetic diet at this time and states she drinks sugary drinks and lots of carbohydrates every day.  She continues to stay active.  She has been doing well on all of her medicines, she is currently will smoking 1 cigarette per week.  She states she saw smoking cessation clinic and was very pleased with results of nicotine gum and patches.  Patient's blood pressure has been under very good control, she has no other acute complaints at this time    Review of Systems   Constitutional:  Negative for activity change, chills and fever.   Respiratory:  Negative for cough and shortness of breath.    Cardiovascular:  Negative for chest pain and leg swelling.   Gastrointestinal:  Negative for abdominal pain.   Genitourinary:  Negative for dysuria.   Musculoskeletal:  Positive for arthralgias and joint swelling (Right wrist).   Neurological:  Negative for weakness and headaches.        Objective     Physical Exam  Constitutional:       Appearance: Normal appearance.   HENT:      Head: Normocephalic and atraumatic.   Cardiovascular:      Rate and Rhythm: Normal rate and regular rhythm.      Heart sounds: Normal heart sounds.   Pulmonary:      Effort: Pulmonary effort is normal.      Breath sounds:  Normal breath sounds.   Abdominal:      General: Bowel sounds are normal.      Palpations: Abdomen is soft.   Musculoskeletal:         General: Tenderness (Tenderness to palpation of right wrist along radial aspect, ROM limited.  Pain over styloid process with ulnar deviation) and signs of injury present.   Skin:     General: Skin is warm.   Neurological:      General: No focal deficit present.      Mental Status: She is alert and oriented to person, place, and time.          Assessment and Plan     Problem List Items Addressed This Visit          Cardiac/Vascular    Hypertension    Hyperlipidemia       Endocrine    Type 2 diabetes mellitus - Primary    Relevant Medications    glipiZIDE (GLUCOTROL) 10 MG tablet    LANTUS SOLOSTAR U-100 INSULIN glargine 100 units/mL SubQ pen    metFORMIN (GLUCOPHAGE) 1000 MG tablet    semaglutide (OZEMPIC) 0.25 mg or 0.5 mg(2 mg/1.5 mL) pen injector    Other Relevant Orders    POCT HEMOGLOBIN A1C (Completed)    Obesity (BMI 30-39.9)    Vitamin D deficiency       Orthopedic    Wrist pain    Relevant Orders    X-Ray Wrist 2 View Right     Other Visit Diagnoses       Nicotine dependence        Relevant Medications    nicotine, polacrilex, (NICORETTE) 2 mg Gum    nicotine (NICODERM CQ) 21 mg/24 hr          Type 2 diabetes, insulin dependent   -patient currently taking 16 units of Lantus daily as well as glipizide 10 mg metformin 1000 mg, ozempic .25mg weekly  -POCT A1c now increasing to 8.5  -gave patient education on diabetic diet in food choices, she states she has not been compliant with these previously  -will also increase Ozempic dosing 2.5 mg weekly  -continue gabapentin 300 mg for associated neuropathy     Hypertension   -continue amlodipine 10 mg daily, lisinopril HCTZ 20-25 mg daily  -blood pressure in good range today, patient states it is never high at home     Vitamin-D deficiency   -confirmed deficiency on prior lab checks, patient has been taking over-the-counter  supplements   -continue cholecalciferol 1000 units daily  -recheck vitamin-D at next visit     Hyperlipidemia   -continue Lipitor 40 mg daily     Right wrist pain   -possible de Quervain tenosynovitis vs OA, vs fracture  -will order x-ray today   -has been wearing brace with no improvement  -may need referral to Orthopedics based on results of imaging    Tobacco use   -currently only smokes 1 cigarette per week   -will prescribe nicotine patch to use as needed   -encouraged patient on cutting down

## 2023-05-01 ENCOUNTER — TELEPHONE (OUTPATIENT)
Dept: SMOKING CESSATION | Facility: CLINIC | Age: 58
End: 2023-05-01
Payer: MEDICAID

## 2023-05-16 ENCOUNTER — TELEPHONE (OUTPATIENT)
Dept: SMOKING CESSATION | Facility: CLINIC | Age: 58
End: 2023-05-16
Payer: MEDICAID

## 2023-05-18 ENCOUNTER — HOSPITAL ENCOUNTER (OUTPATIENT)
Dept: RADIOLOGY | Facility: HOSPITAL | Age: 58
Discharge: HOME | End: 2023-05-18
Attending: STUDENT IN AN ORGANIZED HEALTH CARE EDUCATION/TRAINING PROGRAM
Payer: MEDICAID

## 2023-05-18 DIAGNOSIS — M25.539 PAIN IN WRIST, UNSPECIFIED LATERALITY: ICD-10-CM

## 2023-05-18 PROCEDURE — 73100 X-RAY EXAM OF WRIST: CPT | Mod: TC,RT

## 2023-05-24 ENCOUNTER — CLINICAL SUPPORT (OUTPATIENT)
Dept: SMOKING CESSATION | Facility: CLINIC | Age: 58
End: 2023-05-24
Payer: COMMERCIAL

## 2023-05-24 DIAGNOSIS — F17.200 NICOTINE DEPENDENCE: Primary | ICD-10-CM

## 2023-05-24 PROCEDURE — 99999 PR PBB SHADOW E&M-EST. PATIENT-LVL I: ICD-10-PCS | Mod: PBBFAC,,,

## 2023-05-24 PROCEDURE — 99407 PR TOBACCO USE CESSATION INTENSIVE >10 MINUTES: ICD-10-PCS | Mod: S$GLB,,,

## 2023-05-24 PROCEDURE — 99407 BEHAV CHNG SMOKING > 10 MIN: CPT | Mod: S$GLB,,,

## 2023-05-24 PROCEDURE — 99999 PR PBB SHADOW E&M-EST. PATIENT-LVL I: CPT | Mod: PBBFAC,,,

## 2023-05-24 NOTE — PROGRESS NOTES
Spoke with patient today in regard to smoking cessation progress for 3 month telephone follow up, she states not tobacco free. Patient states having the nicotine patch and gum per PCP and not ready to return to the program at this time. Informed patient of benefit period, future follow ups, and contact information if any further help or support is needed. Will complete smart form for 3 month follow up on Quit attempt #1.

## 2023-08-02 ENCOUNTER — TELEPHONE (OUTPATIENT)
Dept: SMOKING CESSATION | Facility: CLINIC | Age: 58
End: 2023-08-02
Payer: MEDICAID

## 2023-08-03 ENCOUNTER — OFFICE VISIT (OUTPATIENT)
Dept: INTERNAL MEDICINE | Facility: CLINIC | Age: 58
End: 2023-08-03
Payer: MEDICAID

## 2023-08-03 VITALS
RESPIRATION RATE: 18 BRPM | TEMPERATURE: 99 F | HEART RATE: 70 BPM | WEIGHT: 223.63 LBS | HEIGHT: 64 IN | OXYGEN SATURATION: 97 % | DIASTOLIC BLOOD PRESSURE: 70 MMHG | SYSTOLIC BLOOD PRESSURE: 127 MMHG | BODY MASS INDEX: 38.18 KG/M2

## 2023-08-03 DIAGNOSIS — E11.9 TYPE 2 DIABETES MELLITUS WITHOUT COMPLICATION, WITH LONG-TERM CURRENT USE OF INSULIN: Primary | ICD-10-CM

## 2023-08-03 DIAGNOSIS — E55.9 VITAMIN D DEFICIENCY: ICD-10-CM

## 2023-08-03 DIAGNOSIS — M25.531 RIGHT WRIST PAIN: ICD-10-CM

## 2023-08-03 DIAGNOSIS — I10 HYPERTENSION, UNSPECIFIED TYPE: ICD-10-CM

## 2023-08-03 DIAGNOSIS — Z79.4 TYPE 2 DIABETES MELLITUS WITHOUT COMPLICATION, WITH LONG-TERM CURRENT USE OF INSULIN: Primary | ICD-10-CM

## 2023-08-03 DIAGNOSIS — E78.5 HYPERLIPIDEMIA, UNSPECIFIED HYPERLIPIDEMIA TYPE: ICD-10-CM

## 2023-08-03 PROCEDURE — 99213 OFFICE O/P EST LOW 20 MIN: CPT | Mod: PBBFAC | Performed by: STUDENT IN AN ORGANIZED HEALTH CARE EDUCATION/TRAINING PROGRAM

## 2023-08-03 RX ORDER — ATORVASTATIN CALCIUM 40 MG/1
40 TABLET, FILM COATED ORAL DAILY
Qty: 30 TABLET | Refills: 5 | Status: SHIPPED | OUTPATIENT
Start: 2023-08-03 | End: 2024-03-11 | Stop reason: SDUPTHER

## 2023-08-03 RX ORDER — GLIPIZIDE 10 MG/1
10 TABLET ORAL DAILY
Qty: 30 TABLET | Refills: 5 | Status: SHIPPED | OUTPATIENT
Start: 2023-08-03 | End: 2024-03-11 | Stop reason: SDUPTHER

## 2023-08-03 RX ORDER — EZETIMIBE 10 MG/1
10 TABLET ORAL DAILY
Qty: 30 TABLET | Refills: 5 | Status: SHIPPED | OUTPATIENT
Start: 2023-08-03 | End: 2024-03-11 | Stop reason: SDUPTHER

## 2023-08-03 RX ORDER — INSULIN GLARGINE 100 [IU]/ML
INJECTION, SOLUTION SUBCUTANEOUS
Qty: 3 ML | Refills: 3 | Status: SHIPPED | OUTPATIENT
Start: 2023-08-03

## 2023-08-03 RX ORDER — METFORMIN HYDROCHLORIDE 1000 MG/1
1000 TABLET ORAL 2 TIMES DAILY WITH MEALS
Qty: 60 TABLET | Refills: 5 | Status: SHIPPED | OUTPATIENT
Start: 2023-08-03 | End: 2024-03-11 | Stop reason: SDUPTHER

## 2023-08-03 RX ORDER — SEMAGLUTIDE 0.68 MG/ML
INJECTION, SOLUTION SUBCUTANEOUS
COMMUNITY
Start: 2023-04-11 | End: 2023-08-03 | Stop reason: SDUPTHER

## 2023-08-03 RX ORDER — ASPIRIN 81 MG/1
81 TABLET ORAL DAILY
Qty: 30 TABLET | Refills: 5 | Status: SHIPPED | OUTPATIENT
Start: 2023-08-03 | End: 2024-03-11 | Stop reason: SDUPTHER

## 2023-08-03 RX ORDER — SEMAGLUTIDE 0.68 MG/ML
INJECTION, SOLUTION SUBCUTANEOUS
Qty: 3 ML | Refills: 5 | Status: SHIPPED | OUTPATIENT
Start: 2023-08-03 | End: 2024-03-11 | Stop reason: SDUPTHER

## 2023-08-03 RX ORDER — VIT C/E/ZN/COPPR/LUTEIN/ZEAXAN 250MG-90MG
1000 CAPSULE ORAL DAILY
Qty: 30 CAPSULE | Refills: 5 | Status: SHIPPED | OUTPATIENT
Start: 2023-08-03 | End: 2024-03-11 | Stop reason: SDUPTHER

## 2023-08-03 RX ORDER — AMLODIPINE BESYLATE 10 MG/1
10 TABLET ORAL DAILY
Qty: 30 TABLET | Refills: 5 | Status: SHIPPED | OUTPATIENT
Start: 2023-08-03 | End: 2024-03-11 | Stop reason: SDUPTHER

## 2023-08-03 RX ORDER — LISINOPRIL AND HYDROCHLOROTHIAZIDE 20; 25 MG/1; MG/1
1 TABLET ORAL DAILY
Qty: 30 TABLET | Refills: 5 | Status: SHIPPED | OUTPATIENT
Start: 2023-08-03 | End: 2024-03-11 | Stop reason: SDUPTHER

## 2023-08-03 RX ORDER — GABAPENTIN 300 MG/1
300 CAPSULE ORAL 3 TIMES DAILY
Qty: 90 CAPSULE | Refills: 5 | Status: SHIPPED | OUTPATIENT
Start: 2023-08-03 | End: 2024-03-11 | Stop reason: SDUPTHER

## 2023-08-03 RX ORDER — EZETIMIBE 10 MG/1
10 TABLET ORAL
COMMUNITY
Start: 2023-07-14 | End: 2023-08-03 | Stop reason: SDUPTHER

## 2023-08-03 NOTE — PROGRESS NOTES
Subjective     Patient ID: Jill Dey is a 58 y.o. female.    Chief Complaint: Follow-up    Patient is a 58-year-old female with past medical history of hypertension, hyperlipidemia, wrist pain, type 2 diabetes who presents to [facility] internal medicine clinic for scheduled follow-up.  She states she has been doing well since her last visit in May.  Her wrist has continued to hurt, previous x-ray showed some soft tissue swelling but no fracture.  Patient wears brace most of the time and does okay with this, has been limiting Tylenol use but she states it helps when she takes it.  No radiation of pain, no numbness or tingling in hand, does not appear to be nerve related.  Patient has changed diabetic diet, now eating better and she states she has better control of her sugars.  Continues to take insulin as well as metformin, also now on Ozempic every week.  Patient with no other acute complaints at this time      Review of Systems   Constitutional:  Negative for activity change and fever.   Respiratory:  Negative for chest tightness and shortness of breath.    Cardiovascular:  Negative for chest pain and leg swelling.   Gastrointestinal:  Negative for abdominal pain.   Genitourinary:  Negative for difficulty urinating.   Musculoskeletal:  Positive for arthralgias.   Neurological:  Negative for weakness and headaches.          Objective     Physical Exam  Constitutional:       Appearance: Normal appearance.   HENT:      Head: Normocephalic and atraumatic.   Cardiovascular:      Rate and Rhythm: Normal rate and regular rhythm.      Heart sounds: Normal heart sounds.   Pulmonary:      Effort: Pulmonary effort is normal.      Breath sounds: Normal breath sounds.   Abdominal:      Palpations: Abdomen is soft.      Tenderness: There is no abdominal tenderness.   Musculoskeletal:      Comments: Tenderness to palpation of right wrist, brace on place.  No tenderness in elbow joint ring fingers.  No radiation of pain, no  gross deformity   Skin:     General: Skin is warm.   Neurological:      General: No focal deficit present.      Mental Status: She is alert and oriented to person, place, and time.            Assessment and Plan     1. Type 2 diabetes mellitus without complication, with long-term current use of insulin  -     lisinopriL-hydrochlorothiazide (PRINZIDE,ZESTORETIC) 20-25 mg Tab; Take 1 tablet by mouth once daily.  Dispense: 30 tablet; Refill: 5  -     amLODIPine (NORVASC) 10 MG tablet; Take 1 tablet (10 mg total) by mouth once daily.  Dispense: 30 tablet; Refill: 5  -     aspirin (ECOTRIN) 81 MG EC tablet; Take 1 tablet (81 mg total) by mouth once daily.  Dispense: 30 tablet; Refill: 5  -     atorvastatin (LIPITOR) 40 MG tablet; Take 1 tablet (40 mg total) by mouth once daily.  Dispense: 30 tablet; Refill: 5  -     cholecalciferol, vitamin D3, (VITAMIN D3) 25 mcg (1,000 unit) capsule; Take 1 capsule (1,000 Units total) by mouth once daily.  Dispense: 30 capsule; Refill: 5  -     ezetimibe (ZETIA) 10 mg tablet; Take 1 tablet (10 mg total) by mouth once daily.  Dispense: 30 tablet; Refill: 5  -     gabapentin (NEURONTIN) 300 MG capsule; Take 1 capsule (300 mg total) by mouth 3 (three) times daily.  Dispense: 90 capsule; Refill: 5  -     glipiZIDE (GLUCOTROL) 10 MG tablet; Take 1 tablet (10 mg total) by mouth once daily.  Dispense: 30 tablet; Refill: 5  -     LANTUS SOLOSTAR U-100 INSULIN glargine 100 units/mL SubQ pen; 16 Units.  Dispense: 3 mL; Refill: 3  -     metFORMIN (GLUCOPHAGE) 1000 MG tablet; Take 1 tablet (1,000 mg total) by mouth 2 (two) times daily with meals.  Dispense: 60 tablet; Refill: 5  -     OZEMPIC 0.25 mg or 0.5 mg (2 mg/3 mL) pen injector; SMARTSI.25 Milligram(s) SUB-Q Once a Week  Dispense: 3 mL; Refill: 5  -     POCT HEMOGLOBIN A1C  -     CBC Auto Differential; Future; Expected date: 2024  -     Comprehensive Metabolic Panel; Future; Expected date: 2024  -     Lipid Panel; Future;  Expected date: 01/31/2024  -     Hemoglobin A1C; Future; Expected date: 01/31/2024  -     Vitamin D; Future; Expected date: 01/31/2024    2. Hyperlipidemia, unspecified hyperlipidemia type    3. Hypertension, unspecified type    4. Vitamin D deficiency    5. Right wrist pain    Type 2 diabetes, insulin dependent   -patient currently taking 16 units of Lantus daily as well as glipizide 10 mg metformin 1000 mg, ozempic .5mg weekly  -will check A1c today, patient states she has been more compliant with diabetic diet after Education at last visit  -continue gabapentin 300 mg for associated neuropathy     Hypertension   -continue amlodipine 10 mg daily, lisinopril HCTZ 20-25 mg daily  -patient states she has been cutting lisinopril in half for some time, told patient it is okay to continue this as pressure is in good range today     Vitamin-D deficiency   -continue cholecalciferol 1000 units daily  -recheck vitamin-D at next visit     Hyperlipidemia   -continue Lipitor 40 mg daily      Right wrist pain   -x-ray showed some swelling, no acute pathology  -patient states pain is not debilitating, brace continues to help   -should continue to take Tylenol/ibuprofen as needed  -if pain continues or worsens, may need therapy versus more imaging such as MRI     Tobacco use   -now smoking more, about 7 cigarettes per day   -provided education on smoking cessation       Labs with next visit  Follow up in about 6 months (around 2/3/2024).  NIKKO

## 2023-08-09 NOTE — PROGRESS NOTES
I have reviewed and concur with the resident's history, physical, assessment, and plan.  I have discussed with him all issues related to the diagnosis, workup and treatment plan. Care provided as reasonable and necessary.    Jean-Claude Al MD  [facility]

## 2023-10-24 ENCOUNTER — TELEPHONE (OUTPATIENT)
Dept: INTERNAL MEDICINE | Facility: CLINIC | Age: 58
End: 2023-10-24

## 2023-10-24 NOTE — TELEPHONE ENCOUNTER
Pt calling inquiring about his tags for handicap sticker. Pt states she spoke with Dr Hendrix at her last visit 8/3/23 about this and he stated he take care of it. Pt requesting someone call her regarding her tags

## 2023-11-18 ENCOUNTER — OFFICE VISIT (OUTPATIENT)
Dept: INTERNAL MEDICINE | Facility: CLINIC | Age: 58
End: 2023-11-18
Payer: MEDICAID

## 2023-11-18 ENCOUNTER — CLINICAL SUPPORT (OUTPATIENT)
Dept: INTERNAL MEDICINE | Facility: CLINIC | Age: 58
End: 2023-11-18
Attending: STUDENT IN AN ORGANIZED HEALTH CARE EDUCATION/TRAINING PROGRAM
Payer: MEDICAID

## 2023-11-18 VITALS
HEIGHT: 64 IN | WEIGHT: 224 LBS | TEMPERATURE: 98 F | BODY MASS INDEX: 38.24 KG/M2 | DIASTOLIC BLOOD PRESSURE: 78 MMHG | HEART RATE: 62 BPM | OXYGEN SATURATION: 96 % | RESPIRATION RATE: 16 BRPM | SYSTOLIC BLOOD PRESSURE: 152 MMHG

## 2023-11-18 DIAGNOSIS — Z72.0 TOBACCO USE: ICD-10-CM

## 2023-11-18 DIAGNOSIS — E11.9 TYPE 2 DIABETES MELLITUS WITHOUT COMPLICATION, WITHOUT LONG-TERM CURRENT USE OF INSULIN: ICD-10-CM

## 2023-11-18 DIAGNOSIS — I10 HYPERTENSION, UNSPECIFIED TYPE: ICD-10-CM

## 2023-11-18 DIAGNOSIS — Z00.00 MEDICARE ANNUAL WELLNESS VISIT, INITIAL: Primary | ICD-10-CM

## 2023-11-18 DIAGNOSIS — E78.5 HYPERLIPIDEMIA, UNSPECIFIED HYPERLIPIDEMIA TYPE: ICD-10-CM

## 2023-11-18 DIAGNOSIS — Z23 NEED FOR VACCINATION: ICD-10-CM

## 2023-11-18 DIAGNOSIS — Z12.31 OTHER SCREENING MAMMOGRAM: ICD-10-CM

## 2023-11-18 PROBLEM — M25.539 WRIST PAIN: Status: RESOLVED | Noted: 2023-04-11 | Resolved: 2023-11-18

## 2023-11-18 LAB
LEFT EYE DM RETINOPATHY: NEGATIVE
RIGHT EYE DM RETINOPATHY: NEGATIVE

## 2023-11-18 PROCEDURE — 90471 IMMUNIZATION ADMIN: CPT | Mod: PBBFAC

## 2023-11-18 PROCEDURE — 92228 IMG RTA DETC/MNTR DS PHY/QHP: CPT | Mod: PBBFAC

## 2023-11-18 PROCEDURE — 90677 PCV20 VACCINE IM: CPT | Mod: PBBFAC

## 2023-11-18 PROCEDURE — 99214 OFFICE O/P EST MOD 30 MIN: CPT | Mod: PBBFAC | Performed by: STUDENT IN AN ORGANIZED HEALTH CARE EDUCATION/TRAINING PROGRAM

## 2023-11-18 NOTE — PROGRESS NOTES
"Jill Dey presented for an initial Medicare AWV today. The following components were reviewed and updated:    Medical history  Family History  Social history  Allergies and Current Medications  Health Risk Assessment  Health Maintenance  Care Team    **See Completed Assessments for Annual Wellness visit with in the encounter summary    The following assessments were completed:  Depression Screening  Cognitive function Screening  Timed Get Up Test  Whisper Test    Vitals:    11/18/23 1137   BP: (!) 152/78   BP Location: Right arm   Patient Position: Sitting   BP Method: Large (Automatic)   Pulse: 62   Resp: 16   Temp: 98.2 °F (36.8 °C)   TempSrc: Oral   SpO2: 96%   Weight: 101.6 kg (224 lb)   Height: 5' 4" (1.626 m)     Body mass index is 38.45 kg/m².   ]    General: no acute distress  HEENT: normocephalic, atraumatic  Resp: CTAB  CV: regular rate  GI: soft, ND, NT  MSK: no lower extremity edema  Neuro: AAO    Diagnoses and health risks identified today and associated recommendations/orders:  1. Medicare annual wellness visit, initial    2. Hypertension, unspecified type  Elevated, 152/78 today  Forgot to take medication this morning  Reports well controlled when taking medications  Continue current meds, follow up with PCP    3. Hyperlipidemia, unspecified hyperlipidemia type  LDL 66 in 1/2023  Continue statin    4. Type 2 diabetes mellitus without complication, without long-term current use of insulin  Poorly controlled, A1c 8.5 in 4/2023  Continue lantus 16u nightly and metformin  Patient reports she no longer takes ozempic due to side effects  Diabetic eye exam today 11/18/2023  Recommend lifestyle modifications  Follow up with PCP  - Diabetic Eye Screening Photo; Future    5. Other screening mammogram  - Mammo Digital Screening Bilat w/ Ashish; Future    6. Need for vaccination  -     (In Office Administered) Pneumococcal Conjugate Vaccine (20 Valent) (IM) (Preferred)  -     Influenza - Quadrivalent " *Preferred* (6 months+) (PF)      Discussed preventative care with patient, she will need Influenza vaccine Screening mammography Pneumococcal vaccine and Diabetic Eye Exam, patient agrees, all ordered today 11/18/2023.    Provided Jill with a 5-10 year written screening schedule and personal prevention plan. Recommendations were developed using the USPSTF age appropriate recommendations. Education, counseling, and referrals were provided as needed.  After Visit Summary printed and given to patient which includes a list of additional screenings\tests needed.    Follow up in about 1 year (around 11/18/2024).      Tegan Sanz MD  [facility] Internal Medicine, HO-2  11/18/2023

## 2023-11-18 NOTE — PROGRESS NOTES
Jill Dey is a 58 y.o. female here for a diabetic eye screening with non-dilated fundus photos per Dr. Sanz.    Patient cooperative?: Yes  Small pupils?: No  Last eye exam: 11/18/2023    For exam results, see Encounter Report.

## 2023-11-21 DIAGNOSIS — E11.9 TYPE 2 DIABETES MELLITUS WITHOUT COMPLICATION, WITHOUT LONG-TERM CURRENT USE OF INSULIN: Primary | ICD-10-CM

## 2023-12-12 ENCOUNTER — HOSPITAL ENCOUNTER (OUTPATIENT)
Dept: RADIOLOGY | Facility: HOSPITAL | Age: 58
Discharge: HOME | End: 2023-12-12
Attending: STUDENT IN AN ORGANIZED HEALTH CARE EDUCATION/TRAINING PROGRAM
Payer: MEDICAID

## 2023-12-12 DIAGNOSIS — Z72.0 TOBACCO USE: ICD-10-CM

## 2023-12-12 DIAGNOSIS — Z12.31 OTHER SCREENING MAMMOGRAM: ICD-10-CM

## 2023-12-12 PROCEDURE — 77063 BREAST TOMOSYNTHESIS BI: CPT | Mod: 26,,, | Performed by: RADIOLOGY

## 2023-12-12 PROCEDURE — 77067 MAMMO DIGITAL SCREENING BILAT WITH TOMO: ICD-10-PCS | Mod: 26,,, | Performed by: RADIOLOGY

## 2023-12-12 PROCEDURE — 77063 MAMMO DIGITAL SCREENING BILAT WITH TOMO: ICD-10-PCS | Mod: 26,,, | Performed by: RADIOLOGY

## 2023-12-12 PROCEDURE — 77067 SCR MAMMO BI INCL CAD: CPT | Mod: TC

## 2023-12-12 PROCEDURE — 77067 SCR MAMMO BI INCL CAD: CPT | Mod: 26,,, | Performed by: RADIOLOGY

## 2023-12-12 PROCEDURE — 71271 CT THORAX LUNG CANCER SCR C-: CPT | Mod: TC

## 2023-12-28 ENCOUNTER — HOSPITAL ENCOUNTER (OUTPATIENT)
Dept: RADIOLOGY | Facility: HOSPITAL | Age: 58
Discharge: HOME | End: 2023-12-28
Attending: STUDENT IN AN ORGANIZED HEALTH CARE EDUCATION/TRAINING PROGRAM
Payer: MEDICAID

## 2023-12-28 DIAGNOSIS — R92.8 ABNORMAL MAMMOGRAM: ICD-10-CM

## 2023-12-28 PROCEDURE — 77065 DX MAMMO INCL CAD UNI: CPT | Mod: TC,RT

## 2023-12-28 PROCEDURE — 77065 DX MAMMO INCL CAD UNI: CPT | Mod: 26,RT,, | Performed by: RADIOLOGY

## 2023-12-28 PROCEDURE — 77061 MAMMO DIGITAL DIAGNOSTIC RIGHT WITH TOMO: ICD-10-PCS | Mod: 26,RT,, | Performed by: RADIOLOGY

## 2023-12-28 PROCEDURE — 77061 BREAST TOMOSYNTHESIS UNI: CPT | Mod: 26,RT,, | Performed by: RADIOLOGY

## 2023-12-28 PROCEDURE — 77065 MAMMO DIGITAL DIAGNOSTIC RIGHT WITH TOMO: ICD-10-PCS | Mod: 26,RT,, | Performed by: RADIOLOGY

## 2024-02-01 ENCOUNTER — CLINICAL SUPPORT (OUTPATIENT)
Dept: SMOKING CESSATION | Facility: CLINIC | Age: 59
End: 2024-02-01
Payer: COMMERCIAL

## 2024-02-01 DIAGNOSIS — F17.200 NICOTINE DEPENDENCE, UNCOMPLICATED: Primary | ICD-10-CM

## 2024-02-01 PROCEDURE — 99407 BEHAV CHNG SMOKING > 10 MIN: CPT | Mod: S$GLB,,, | Performed by: GENERAL PRACTICE

## 2024-02-01 NOTE — PROGRESS NOTES
Spoke with patient today in regard to smoking cessation progress for 12 month follow up. She states she is tobacco free since the week before Thanksgiving. Congratulated patient on their success to remain tobacco free. Patient has even gotten her son scheduled for an appointment to help him quit. Informed patient of benefit period, future follow ups and contact information if any further help is needed. Will complete/ resolve smart form for 6/12 month follow up for Quit # 1.

## 2024-03-07 ENCOUNTER — HOSPITAL ENCOUNTER (OUTPATIENT)
Dept: RADIOLOGY | Facility: HOSPITAL | Age: 59
Discharge: HOME | End: 2024-03-07
Attending: STUDENT IN AN ORGANIZED HEALTH CARE EDUCATION/TRAINING PROGRAM
Payer: MEDICAID

## 2024-03-07 DIAGNOSIS — R92.1 BREAST CALCIFICATIONS: ICD-10-CM

## 2024-03-07 DIAGNOSIS — R92.1 BREAST CALCIFICATION, RIGHT: Primary | ICD-10-CM

## 2024-03-07 PROCEDURE — 88305 TISSUE EXAM BY PATHOLOGIST: CPT | Mod: TC | Performed by: RADIOLOGY

## 2024-03-07 PROCEDURE — A4648 IMPLANTABLE TISSUE MARKER: HCPCS

## 2024-03-07 PROCEDURE — 19081 BX BREAST 1ST LESION STRTCTC: CPT | Mod: RT,,, | Performed by: RADIOLOGY

## 2024-03-07 RX ORDER — LIDOCAINE HYDROCHLORIDE 20 MG/ML
INJECTION, SOLUTION EPIDURAL; INFILTRATION; INTRACAUDAL; PERINEURAL
Status: DISCONTINUED
Start: 2024-03-07 | End: 2024-03-08 | Stop reason: HOSPADM

## 2024-03-07 RX ORDER — LIDOCAINE HCL/EPINEPHRINE/PF 2%-1:200K
VIAL (ML) INJECTION
Status: DISCONTINUED
Start: 2024-03-07 | End: 2024-03-08 | Stop reason: HOSPADM

## 2024-03-08 LAB
ESTROGEN SERPL-MCNC: NORMAL PG/ML
INSULIN SERPL-ACNC: NORMAL U[IU]/ML
LAB AP CLINICAL INFORMATION: NORMAL
LAB AP GROSS DESCRIPTION: NORMAL
LAB AP REPORT FOOTNOTES: NORMAL
T3RU NFR SERPL: NORMAL %

## 2024-03-11 ENCOUNTER — TELEPHONE (OUTPATIENT)
Dept: RADIOLOGY | Facility: HOSPITAL | Age: 59
End: 2024-03-11
Payer: MEDICAID

## 2024-03-11 ENCOUNTER — LAB VISIT (OUTPATIENT)
Dept: LAB | Facility: HOSPITAL | Age: 59
End: 2024-03-11
Attending: STUDENT IN AN ORGANIZED HEALTH CARE EDUCATION/TRAINING PROGRAM
Payer: MEDICAID

## 2024-03-11 ENCOUNTER — OFFICE VISIT (OUTPATIENT)
Dept: INTERNAL MEDICINE | Facility: CLINIC | Age: 59
End: 2024-03-11
Payer: MEDICAID

## 2024-03-11 VITALS
HEIGHT: 64 IN | DIASTOLIC BLOOD PRESSURE: 69 MMHG | HEART RATE: 84 BPM | OXYGEN SATURATION: 98 % | WEIGHT: 225.19 LBS | BODY MASS INDEX: 38.44 KG/M2 | RESPIRATION RATE: 20 BRPM | SYSTOLIC BLOOD PRESSURE: 144 MMHG | TEMPERATURE: 97 F

## 2024-03-11 DIAGNOSIS — E11.9 TYPE 2 DIABETES MELLITUS WITHOUT COMPLICATION, WITH LONG-TERM CURRENT USE OF INSULIN: ICD-10-CM

## 2024-03-11 DIAGNOSIS — Z79.4 TYPE 2 DIABETES MELLITUS WITHOUT COMPLICATION, WITH LONG-TERM CURRENT USE OF INSULIN: ICD-10-CM

## 2024-03-11 DIAGNOSIS — E11.9 TYPE 2 DIABETES MELLITUS WITHOUT COMPLICATION, WITHOUT LONG-TERM CURRENT USE OF INSULIN: ICD-10-CM

## 2024-03-11 DIAGNOSIS — E11.9 TYPE 2 DIABETES MELLITUS WITHOUT COMPLICATION, WITHOUT LONG-TERM CURRENT USE OF INSULIN: Primary | ICD-10-CM

## 2024-03-11 LAB
ALBUMIN SERPL-MCNC: 3.9 G/DL (ref 3.4–4.8)
ALBUMIN/GLOB SERPL: 1 RATIO (ref 1.1–2)
ALP SERPL-CCNC: 88 UNIT/L (ref 40–150)
ALT SERPL-CCNC: 9 UNIT/L (ref 0–55)
AST SERPL-CCNC: 12 UNIT/L (ref 5–34)
BASOPHILS # BLD AUTO: 0.07 X10(3)/MCL
BASOPHILS NFR BLD AUTO: 0.5 %
BILIRUB SERPL-MCNC: 0.7 MG/DL
BUN SERPL-MCNC: 12.2 MG/DL (ref 9.8–20.1)
CALCIUM SERPL-MCNC: 10.4 MG/DL (ref 8.4–10.2)
CHLORIDE SERPL-SCNC: 105 MMOL/L (ref 98–107)
CHOLEST SERPL-MCNC: 137 MG/DL
CHOLEST/HDLC SERPL: 3 {RATIO} (ref 0–5)
CO2 SERPL-SCNC: 29 MMOL/L (ref 23–31)
CREAT SERPL-MCNC: 0.79 MG/DL (ref 0.55–1.02)
DEPRECATED CALCIDIOL+CALCIFEROL SERPL-MC: 25.8 NG/ML (ref 30–80)
EOSINOPHIL # BLD AUTO: 1.34 X10(3)/MCL (ref 0–0.9)
EOSINOPHIL NFR BLD AUTO: 9.4 %
ERYTHROCYTE [DISTWIDTH] IN BLOOD BY AUTOMATED COUNT: 14.6 % (ref 11.5–17)
EST. AVERAGE GLUCOSE BLD GHB EST-MCNC: 174.3 MG/DL
GFR SERPLBLD CREATININE-BSD FMLA CKD-EPI: >60 MLS/MIN/1.73/M2
GLOBULIN SER-MCNC: 3.9 GM/DL (ref 2.4–3.5)
GLUCOSE SERPL-MCNC: 198 MG/DL (ref 82–115)
HBA1C MFR BLD: 7.7 %
HBA1C MFR BLD: 9.2 %
HCT VFR BLD AUTO: 40.3 % (ref 37–47)
HDLC SERPL-MCNC: 45 MG/DL (ref 35–60)
HGB BLD-MCNC: 13.4 G/DL (ref 12–16)
IMM GRANULOCYTES # BLD AUTO: 0.04 X10(3)/MCL (ref 0–0.04)
IMM GRANULOCYTES NFR BLD AUTO: 0.3 %
LDLC SERPL CALC-MCNC: 60 MG/DL (ref 50–140)
LYMPHOCYTES # BLD AUTO: 3.6 X10(3)/MCL (ref 0.6–4.6)
LYMPHOCYTES NFR BLD AUTO: 25.4 %
MCH RBC QN AUTO: 29.6 PG (ref 27–31)
MCHC RBC AUTO-ENTMCNC: 33.3 G/DL (ref 33–36)
MCV RBC AUTO: 89.2 FL (ref 80–94)
MONOCYTES # BLD AUTO: 0.75 X10(3)/MCL (ref 0.1–1.3)
MONOCYTES NFR BLD AUTO: 5.3 %
NEUTROPHILS # BLD AUTO: 8.39 X10(3)/MCL (ref 2.1–9.2)
NEUTROPHILS NFR BLD AUTO: 59.1 %
NRBC BLD AUTO-RTO: 0 %
PLATELET # BLD AUTO: 256 X10(3)/MCL (ref 130–400)
PMV BLD AUTO: 11 FL (ref 7.4–10.4)
POTASSIUM SERPL-SCNC: 3.8 MMOL/L (ref 3.5–5.1)
PROT SERPL-MCNC: 7.8 GM/DL (ref 5.8–7.6)
RBC # BLD AUTO: 4.52 X10(6)/MCL (ref 4.2–5.4)
SODIUM SERPL-SCNC: 142 MMOL/L (ref 136–145)
TRIGL SERPL-MCNC: 159 MG/DL (ref 37–140)
VLDLC SERPL CALC-MCNC: 32 MG/DL
WBC # SPEC AUTO: 14.19 X10(3)/MCL (ref 4.5–11.5)

## 2024-03-11 PROCEDURE — 83036 HEMOGLOBIN GLYCOSYLATED A1C: CPT | Mod: PBBFAC,91 | Performed by: STUDENT IN AN ORGANIZED HEALTH CARE EDUCATION/TRAINING PROGRAM

## 2024-03-11 PROCEDURE — 80061 LIPID PANEL: CPT

## 2024-03-11 PROCEDURE — 83036 HEMOGLOBIN GLYCOSYLATED A1C: CPT

## 2024-03-11 PROCEDURE — 80053 COMPREHEN METABOLIC PANEL: CPT

## 2024-03-11 PROCEDURE — 36415 COLL VENOUS BLD VENIPUNCTURE: CPT

## 2024-03-11 PROCEDURE — 99214 OFFICE O/P EST MOD 30 MIN: CPT | Mod: PBBFAC | Performed by: STUDENT IN AN ORGANIZED HEALTH CARE EDUCATION/TRAINING PROGRAM

## 2024-03-11 PROCEDURE — 85025 COMPLETE CBC W/AUTO DIFF WBC: CPT

## 2024-03-11 PROCEDURE — 82306 VITAMIN D 25 HYDROXY: CPT

## 2024-03-11 RX ORDER — INSULIN PUMP SYRINGE, 3 ML
EACH MISCELLANEOUS
Qty: 1 EACH | Refills: 0 | Status: SHIPPED | OUTPATIENT
Start: 2024-03-11 | End: 2025-03-11

## 2024-03-11 RX ORDER — LANCETS
EACH MISCELLANEOUS
Qty: 100 EACH | Refills: 0 | Status: SHIPPED | OUTPATIENT
Start: 2024-03-11

## 2024-03-11 RX ORDER — METHOCARBAMOL 500 MG/1
500 TABLET, FILM COATED ORAL 3 TIMES DAILY
COMMUNITY
Start: 2024-02-18 | End: 2024-06-05

## 2024-03-11 RX ORDER — KETOROLAC TROMETHAMINE 10 MG/1
10 TABLET, FILM COATED ORAL
COMMUNITY
Start: 2024-02-18 | End: 2024-06-05

## 2024-03-11 RX ORDER — SEMAGLUTIDE 0.68 MG/ML
INJECTION, SOLUTION SUBCUTANEOUS
Qty: 3 ML | Refills: 5 | Status: SHIPPED | OUTPATIENT
Start: 2024-03-11 | End: 2024-03-11 | Stop reason: SDUPTHER

## 2024-03-11 RX ORDER — LISINOPRIL AND HYDROCHLOROTHIAZIDE 20; 25 MG/1; MG/1
1 TABLET ORAL DAILY
Qty: 30 TABLET | Refills: 5 | Status: SHIPPED | OUTPATIENT
Start: 2024-03-11 | End: 2024-06-05 | Stop reason: SDUPTHER

## 2024-03-11 RX ORDER — GABAPENTIN 300 MG/1
300 CAPSULE ORAL 3 TIMES DAILY
Qty: 90 CAPSULE | Refills: 5 | Status: SHIPPED | OUTPATIENT
Start: 2024-03-11 | End: 2024-06-05 | Stop reason: SDUPTHER

## 2024-03-11 RX ORDER — SEMAGLUTIDE 0.68 MG/ML
INJECTION, SOLUTION SUBCUTANEOUS
Qty: 3 ML | Refills: 5 | Status: SHIPPED | OUTPATIENT
Start: 2024-03-11 | End: 2024-06-05 | Stop reason: SDUPTHER

## 2024-03-11 RX ORDER — SYRINGE,SAFETY WITH NEEDLE,1ML 25GX1"
1 SYRINGE (EA) MISCELLANEOUS DAILY
Qty: 100 EACH | Refills: 0 | Status: SHIPPED | OUTPATIENT
Start: 2024-03-11

## 2024-03-11 RX ORDER — ATORVASTATIN CALCIUM 40 MG/1
40 TABLET, FILM COATED ORAL DAILY
Qty: 30 TABLET | Refills: 5 | Status: SHIPPED | OUTPATIENT
Start: 2024-03-11 | End: 2024-06-05 | Stop reason: SDUPTHER

## 2024-03-11 RX ORDER — AMLODIPINE BESYLATE 10 MG/1
10 TABLET ORAL DAILY
Qty: 30 TABLET | Refills: 5 | Status: SHIPPED | OUTPATIENT
Start: 2024-03-11 | End: 2024-06-05 | Stop reason: SDUPTHER

## 2024-03-11 RX ORDER — EZETIMIBE 10 MG/1
10 TABLET ORAL DAILY
Qty: 30 TABLET | Refills: 5 | Status: SHIPPED | OUTPATIENT
Start: 2024-03-11 | End: 2024-06-05 | Stop reason: SDUPTHER

## 2024-03-11 RX ORDER — GLIPIZIDE 10 MG/1
10 TABLET ORAL DAILY
Qty: 30 TABLET | Refills: 5 | Status: SHIPPED | OUTPATIENT
Start: 2024-03-11 | End: 2024-06-05 | Stop reason: SDUPTHER

## 2024-03-11 RX ORDER — VIT C/E/ZN/COPPR/LUTEIN/ZEAXAN 250MG-90MG
1000 CAPSULE ORAL DAILY
Qty: 30 CAPSULE | Refills: 5 | Status: SHIPPED | OUTPATIENT
Start: 2024-03-11 | End: 2024-05-09 | Stop reason: SDUPTHER

## 2024-03-11 RX ORDER — METFORMIN HYDROCHLORIDE 1000 MG/1
1000 TABLET ORAL 2 TIMES DAILY WITH MEALS
Qty: 60 TABLET | Refills: 5 | Status: SHIPPED | OUTPATIENT
Start: 2024-03-11 | End: 2024-06-05 | Stop reason: SDUPTHER

## 2024-03-11 RX ORDER — ASPIRIN 81 MG/1
81 TABLET ORAL DAILY
Qty: 30 TABLET | Refills: 5 | Status: SHIPPED | OUTPATIENT
Start: 2024-03-11 | End: 2024-05-09 | Stop reason: SDUPTHER

## 2024-03-11 RX ORDER — ALBUTEROL SULFATE 90 UG/1
1 AEROSOL, METERED RESPIRATORY (INHALATION) EVERY 6 HOURS PRN
Qty: 18 G | Refills: 1 | Status: SHIPPED | OUTPATIENT
Start: 2024-03-11 | End: 2024-05-09 | Stop reason: SDUPTHER

## 2024-03-11 NOTE — TELEPHONE ENCOUNTER
----- Message from Je Red MD sent at 3/8/2024  4:34 PM CST -----  Regarding: Benign Pathology  Please see below.     Sanjana, please communicate the results of the biopsy and recommendations to the patient.    Thanks.            Pathology is now available for review, and demonstrates:  Stereotactic/tomosynthesis-guided core biopsy of the right slightly upper central breast group of calcifications, middle depth, spanning approximately 5 mm.  MammoSTAR barbell biopsy clip is approximately 3.2 cm medial to the biopsy site.  The medially adjacent right slightly upper central breast group of calcifications, middle depth, spanning approximately 3 mm appear to have also been removed during the biopsy.  - Fibrocystic changes consisting of fibroadenomatoid change with multiple microcalcifications, cystic apocrine metaplasia, and benign ductal units.  - No evidence of carcinoma or atypia.    Please see pathology report for full details. These pathology results are concordant with imaging findings.     Recommend continued annual screening mammography (with Digital Breast Tomosynthesis) due in 12/2024, according to American College of Rad[employer] guidelines.    Pathology results and recommendations will be communicated by Sanjana Tay [facility] Breast Imaging nurse navigator, to the patient/provider and documented in the electronic medical record.

## 2024-03-11 NOTE — PROGRESS NOTES
Subjective     Patient ID: Jill Dey is a 59 y.o. female.    Chief Complaint: Diabetes (Had biops of r breast last week , pain in left arm went to Ed  2 weeks ago )    Patient is a 58-year-old female with past medical history of hypertension, hyperlipidemia, wrist pain, type 2 diabetes who presents to [facility] internal medicine clinic for scheduled follow-up.  Patient states she has been doing well since her last visit.  Recently had biopsy of breast last week after slightly abnormal mammogram.  Pathology from this did not show any carcinoma or atypia.  Patient states she continues to have arthritis and shoulder, uses patches as well as Tylenol as needed and switches between the 2.  She states this controls the pain well.  Continues to take insulin and multiple oral medications for diabetes, states she has been eating poorly recently and has not been watching diet as well.  Remains on Ozempic, no recent weight loss.  Patient overall states she feels well though, recently had trip to [place] where she was walking extensively.  Did not have labs drawn after last visit, no other acute concerns at this time        Review of Systems   Constitutional:  Negative for activity change and fever.   Respiratory:  Negative for chest tightness and shortness of breath.    Cardiovascular:  Negative for chest pain and leg swelling.   Gastrointestinal:  Negative for abdominal pain.   Genitourinary:  Negative for difficulty urinating.   Musculoskeletal:  Positive for arthralgias.   Neurological:  Negative for weakness and headaches.        Objective     Physical Exam  Constitutional:       Appearance: Normal appearance.   HENT:      Head: Normocephalic and atraumatic.   Cardiovascular:      Rate and Rhythm: Normal rate and regular rhythm.      Heart sounds: Normal heart sounds.   Pulmonary:      Effort: Pulmonary effort is normal.      Breath sounds: Normal breath sounds.   Abdominal:      Palpations: Abdomen is soft.       Tenderness: There is no abdominal tenderness.   Skin:     General: Skin is warm.   Neurological:      General: No focal deficit present.      Mental Status: She is alert and oriented to person, place, and time.          Assessment and Plan     1. Type 2 diabetes mellitus without complication, without long-term current use of insulin  -     POCT HEMOGLOBIN A1C  -     Vitamin D; Future; Expected date: 03/11/2024  -     Comprehensive Metabolic Panel; Future; Expected date: 03/11/2024  -     Lipid Panel; Future; Expected date: 03/11/2024  -     CBC Auto Differential; Future; Expected date: 03/11/2024    2. Type 2 diabetes mellitus without complication, with long-term current use of insulin  -     amLODIPine (NORVASC) 10 MG tablet; Take 1 tablet (10 mg total) by mouth once daily.  Dispense: 30 tablet; Refill: 5  -     aspirin (ECOTRIN) 81 MG EC tablet; Take 1 tablet (81 mg total) by mouth once daily.  Dispense: 30 tablet; Refill: 5  -     atorvastatin (LIPITOR) 40 MG tablet; Take 1 tablet (40 mg total) by mouth once daily.  Dispense: 30 tablet; Refill: 5  -     cholecalciferol, vitamin D3, (VITAMIN D3) 25 mcg (1,000 unit) capsule; Take 1 capsule (1,000 Units total) by mouth once daily.  Dispense: 30 capsule; Refill: 5  -     ezetimibe (ZETIA) 10 mg tablet; Take 1 tablet (10 mg total) by mouth once daily.  Dispense: 30 tablet; Refill: 5  -     gabapentin (NEURONTIN) 300 MG capsule; Take 1 capsule (300 mg total) by mouth 3 (three) times daily.  Dispense: 90 capsule; Refill: 5  -     glipiZIDE (GLUCOTROL) 10 MG tablet; Take 1 tablet (10 mg total) by mouth once daily.  Dispense: 30 tablet; Refill: 5  -     lisinopriL-hydrochlorothiazide (PRINZIDE,ZESTORETIC) 20-25 mg Tab; Take 1 tablet by mouth once daily.  Dispense: 30 tablet; Refill: 5  -     metFORMIN (GLUCOPHAGE) 1000 MG tablet; Take 1 tablet (1,000 mg total) by mouth 2 (two) times daily with meals.  Dispense: 60 tablet; Refill: 5  -     OZEMPIC 0.25 mg or 0.5 mg (2 mg/3  mL) pen injector; SMARTSI.25 Milligram(s) SUB-Q Once a Week  Dispense: 3 mL; Refill: 5    Other orders  -     albuterol (PROVENTIL/VENTOLIN HFA) 90 mcg/actuation inhaler; Inhale 1 puff into the lungs every 6 (six) hours as needed for Wheezing.  Dispense: 18 g; Refill: 1  -     blood-glucose meter kit; To check BG 1 times daily, to use with insurance preferred meter  Dispense: 1 each; Refill: 0  -     lancets Misc; To check BG 1 times daily, to use with insurance preferred meter  Dispense: 100 each; Refill: 0  -     blood sugar diagnostic Strp; To check BG 1 times daily, to use with insurance preferred meter  Dispense: 100 each; Refill: 0  -     insulin syringe-needle U-100 1 mL 31 gauge x 5/16 Syrg; 1 each by Misc.(Non-Drug; Combo Route) route once daily.  Dispense: 100 each; Refill: 0    Type 2 diabetes, insulin dependent   -patient currently taking 16 units of Lantus daily as well as glipizide 10 mg metformin 1000 mg, ozempic .5mg weekly  -A1c is slightly worse at 9.2, however A1c on labs showed 7.7   -represcribed diabetic supplies, patient should check sugar in morning and adjust nightly regimen by 1 unit every 3 days until morning sugar is under 140  -continue gabapentin 300 mg for associated neuropathy    Osteoarthritis   -continue using patches, Tylenol as needed   -can also switch in ibuprofen every once in awhile   -pain currently well controlled     Hypertension   -continue amlodipine 10 mg daily, lisinopril HCTZ 20-25 mg daily     Vitamin-D deficiency   -continue cholecalciferol 1000 units daily  -repeat vitamin-D remains slightly low, continue supplementation for now     Hyperlipidemia   -continue Lipitor 40 mg daily      Tobacco use   -has recently quit smoking, congratulated patient on this success    Labs today  Patient recently had flu vaccine, Pneumovax, LDCT, mammogram  Refilled medication as appropriate         Follow up in about 3 months (around 2024).  NIKKO

## 2024-03-12 NOTE — PROGRESS NOTES
Attending Addendum:   Patient seen and examined in clinic. Management and Plan were discussed with resident. Care was reasonable and necessary.   Jeff Batista MD  Internal Medicine   [facility]

## 2024-05-09 DIAGNOSIS — Z79.4 TYPE 2 DIABETES MELLITUS WITHOUT COMPLICATION, WITH LONG-TERM CURRENT USE OF INSULIN: ICD-10-CM

## 2024-05-09 DIAGNOSIS — Z72.0 TOBACCO USE: Primary | ICD-10-CM

## 2024-05-09 DIAGNOSIS — E11.9 TYPE 2 DIABETES MELLITUS WITHOUT COMPLICATION, WITH LONG-TERM CURRENT USE OF INSULIN: ICD-10-CM

## 2024-05-09 RX ORDER — VIT C/E/ZN/COPPR/LUTEIN/ZEAXAN 250MG-90MG
1000 CAPSULE ORAL DAILY
Qty: 30 CAPSULE | Refills: 5 | Status: SHIPPED | OUTPATIENT
Start: 2024-05-09 | End: 2024-06-05 | Stop reason: SDUPTHER

## 2024-05-09 RX ORDER — ASPIRIN 81 MG/1
81 TABLET ORAL DAILY
Qty: 30 TABLET | Refills: 5 | Status: SHIPPED | OUTPATIENT
Start: 2024-05-09 | End: 2024-06-05 | Stop reason: SDUPTHER

## 2024-05-09 RX ORDER — ALBUTEROL SULFATE 90 UG/1
1 AEROSOL, METERED RESPIRATORY (INHALATION) EVERY 6 HOURS PRN
Qty: 18 G | Refills: 1 | Status: SHIPPED | OUTPATIENT
Start: 2024-05-09 | End: 2024-06-05 | Stop reason: SDUPTHER

## 2024-06-05 ENCOUNTER — OFFICE VISIT (OUTPATIENT)
Dept: INTERNAL MEDICINE | Facility: CLINIC | Age: 59
End: 2024-06-05
Payer: MEDICAID

## 2024-06-05 VITALS
RESPIRATION RATE: 16 BRPM | BODY MASS INDEX: 38.58 KG/M2 | WEIGHT: 226 LBS | SYSTOLIC BLOOD PRESSURE: 132 MMHG | DIASTOLIC BLOOD PRESSURE: 70 MMHG | HEART RATE: 63 BPM | TEMPERATURE: 98 F | HEIGHT: 64 IN | OXYGEN SATURATION: 96 %

## 2024-06-05 DIAGNOSIS — Z72.0 TOBACCO USE: ICD-10-CM

## 2024-06-05 DIAGNOSIS — Z79.4 TYPE 2 DIABETES MELLITUS WITHOUT COMPLICATION, WITH LONG-TERM CURRENT USE OF INSULIN: ICD-10-CM

## 2024-06-05 DIAGNOSIS — E11.9 TYPE 2 DIABETES MELLITUS WITHOUT COMPLICATION, WITH LONG-TERM CURRENT USE OF INSULIN: ICD-10-CM

## 2024-06-05 PROCEDURE — 99214 OFFICE O/P EST MOD 30 MIN: CPT | Mod: PBBFAC | Performed by: STUDENT IN AN ORGANIZED HEALTH CARE EDUCATION/TRAINING PROGRAM

## 2024-06-05 RX ORDER — INSULIN GLARGINE 100 [IU]/ML
INJECTION, SOLUTION SUBCUTANEOUS
Qty: 3 ML | Refills: 3 | Status: SHIPPED | OUTPATIENT
Start: 2024-06-05 | End: 2024-06-05

## 2024-06-05 RX ORDER — ATORVASTATIN CALCIUM 40 MG/1
40 TABLET, FILM COATED ORAL DAILY
Qty: 30 TABLET | Refills: 5 | Status: SHIPPED | OUTPATIENT
Start: 2024-06-05 | End: 2024-06-05

## 2024-06-05 RX ORDER — GABAPENTIN 300 MG/1
300 CAPSULE ORAL 3 TIMES DAILY
Qty: 90 CAPSULE | Refills: 5 | Status: SHIPPED | OUTPATIENT
Start: 2024-06-05 | End: 2024-12-02

## 2024-06-05 RX ORDER — SEMAGLUTIDE 0.68 MG/ML
0.5 INJECTION, SOLUTION SUBCUTANEOUS
Qty: 3 ML | Refills: 5 | Status: SHIPPED | OUTPATIENT
Start: 2024-06-05 | End: 2024-06-05

## 2024-06-05 RX ORDER — ASPIRIN 81 MG/1
81 TABLET ORAL DAILY
Qty: 30 TABLET | Refills: 5 | Status: SHIPPED | OUTPATIENT
Start: 2024-06-05

## 2024-06-05 RX ORDER — ATORVASTATIN CALCIUM 40 MG/1
40 TABLET, FILM COATED ORAL DAILY
Qty: 30 TABLET | Refills: 5 | Status: SHIPPED | OUTPATIENT
Start: 2024-06-05

## 2024-06-05 RX ORDER — GABAPENTIN 300 MG/1
300 CAPSULE ORAL 3 TIMES DAILY
Qty: 90 CAPSULE | Refills: 5 | Status: SHIPPED | OUTPATIENT
Start: 2024-06-05 | End: 2024-06-05

## 2024-06-05 RX ORDER — ASPIRIN 81 MG/1
81 TABLET ORAL DAILY
Qty: 30 TABLET | Refills: 5 | Status: SHIPPED | OUTPATIENT
Start: 2024-06-05 | End: 2024-06-05

## 2024-06-05 RX ORDER — SEMAGLUTIDE 0.68 MG/ML
0.5 INJECTION, SOLUTION SUBCUTANEOUS
Qty: 3 ML | Refills: 5 | Status: SHIPPED | OUTPATIENT
Start: 2024-06-05 | End: 2024-06-05 | Stop reason: SDUPTHER

## 2024-06-05 RX ORDER — EZETIMIBE 10 MG/1
10 TABLET ORAL DAILY
Qty: 30 TABLET | Refills: 5 | Status: SHIPPED | OUTPATIENT
Start: 2024-06-05 | End: 2024-06-05

## 2024-06-05 RX ORDER — EZETIMIBE 10 MG/1
10 TABLET ORAL DAILY
Qty: 30 TABLET | Refills: 5 | Status: SHIPPED | OUTPATIENT
Start: 2024-06-05

## 2024-06-05 RX ORDER — LISINOPRIL AND HYDROCHLOROTHIAZIDE 20; 25 MG/1; MG/1
1 TABLET ORAL DAILY
Qty: 30 TABLET | Refills: 5 | Status: SHIPPED | OUTPATIENT
Start: 2024-06-05 | End: 2024-06-05

## 2024-06-05 RX ORDER — AMLODIPINE BESYLATE 10 MG/1
10 TABLET ORAL DAILY
Qty: 30 TABLET | Refills: 5 | Status: SHIPPED | OUTPATIENT
Start: 2024-06-05 | End: 2024-12-02

## 2024-06-05 RX ORDER — VIT C/E/ZN/COPPR/LUTEIN/ZEAXAN 250MG-90MG
1000 CAPSULE ORAL DAILY
Qty: 30 CAPSULE | Refills: 5 | Status: SHIPPED | OUTPATIENT
Start: 2024-06-05 | End: 2024-06-05

## 2024-06-05 RX ORDER — GLIPIZIDE 10 MG/1
10 TABLET ORAL DAILY
Qty: 30 TABLET | Refills: 5 | Status: SHIPPED | OUTPATIENT
Start: 2024-06-05

## 2024-06-05 RX ORDER — ALBUTEROL SULFATE 90 UG/1
1 AEROSOL, METERED RESPIRATORY (INHALATION) EVERY 6 HOURS PRN
Qty: 18 G | Refills: 1 | Status: SHIPPED | OUTPATIENT
Start: 2024-06-05 | End: 2027-01-14

## 2024-06-05 RX ORDER — METFORMIN HYDROCHLORIDE 1000 MG/1
1000 TABLET ORAL 2 TIMES DAILY WITH MEALS
Qty: 60 TABLET | Refills: 5 | Status: SHIPPED | OUTPATIENT
Start: 2024-06-05 | End: 2024-06-05

## 2024-06-05 RX ORDER — METFORMIN HYDROCHLORIDE 1000 MG/1
1000 TABLET ORAL 2 TIMES DAILY WITH MEALS
Qty: 60 TABLET | Refills: 5 | Status: SHIPPED | OUTPATIENT
Start: 2024-06-05

## 2024-06-05 RX ORDER — INSULIN GLARGINE 100 [IU]/ML
INJECTION, SOLUTION SUBCUTANEOUS
Qty: 3 ML | Refills: 3 | Status: SHIPPED | OUTPATIENT
Start: 2024-06-05

## 2024-06-05 RX ORDER — LIDOCAINE 50 MG/G
1 PATCH TOPICAL DAILY
Qty: 30 PATCH | Refills: 0 | Status: SHIPPED | OUTPATIENT
Start: 2024-06-05

## 2024-06-05 RX ORDER — GLIPIZIDE 10 MG/1
10 TABLET ORAL DAILY
Qty: 30 TABLET | Refills: 5 | Status: SHIPPED | OUTPATIENT
Start: 2024-06-05 | End: 2024-06-05

## 2024-06-05 RX ORDER — VIT C/E/ZN/COPPR/LUTEIN/ZEAXAN 250MG-90MG
1000 CAPSULE ORAL DAILY
Qty: 30 CAPSULE | Refills: 5 | Status: SHIPPED | OUTPATIENT
Start: 2024-06-05

## 2024-06-05 RX ORDER — SEMAGLUTIDE 0.68 MG/ML
0.5 INJECTION, SOLUTION SUBCUTANEOUS
Qty: 3 ML | Refills: 5 | Status: SHIPPED | OUTPATIENT
Start: 2024-06-05

## 2024-06-05 RX ORDER — AMLODIPINE BESYLATE 10 MG/1
10 TABLET ORAL DAILY
Qty: 30 TABLET | Refills: 5 | Status: SHIPPED | OUTPATIENT
Start: 2024-06-05 | End: 2024-06-05

## 2024-06-05 RX ORDER — LISINOPRIL AND HYDROCHLOROTHIAZIDE 20; 25 MG/1; MG/1
1 TABLET ORAL DAILY
Qty: 30 TABLET | Refills: 5 | Status: SHIPPED | OUTPATIENT
Start: 2024-06-05

## 2024-06-05 RX ORDER — ALBUTEROL SULFATE 90 UG/1
1 AEROSOL, METERED RESPIRATORY (INHALATION) EVERY 6 HOURS PRN
Qty: 18 G | Refills: 1 | Status: SHIPPED | OUTPATIENT
Start: 2024-06-05 | End: 2024-06-05

## 2024-06-05 NOTE — PROGRESS NOTES
Attending Addendum:   Patient seen and examined in clinic. Management and Plan were discussed with resident. Care was reasonable and necessary.   Jeff Batista MD  Internal Medicine   [facility]

## 2024-06-05 NOTE — PROGRESS NOTES
Subjective     Patient ID: Jill Dey is a 59 y.o. female.    Chief Complaint: Follow-up (Routine follow up visit. )    Patient is a 58-year-old female with past medical history of hypertension, hyperlipidemia, wrist pain, type 2 diabetes who presents to [facility] internal medicine clinic for scheduled follow-up.  She states she has been doing very well since her last visit.  Has recently started smoking again and is reaching out to smoking cessation clinic for help with quitting.  Patient states shoulder pain is biggest issue currently, she uses over-the-counter pain patches as needed and is requesting something else prescribed.  Patient remains on low-dose Ozempic and has not had significant weight loss.  Continues to take medicines for diabetes and A1c has remained stable.  Patient increasing activity as tolerated and watching diet.  Requesting prescriptions be printed out as patient has moved to [place] and is switching pharmacies      Review of Systems   Constitutional:  Negative for activity change and fever.   Respiratory:  Negative for chest tightness and shortness of breath.    Cardiovascular:  Negative for chest pain and leg swelling.   Gastrointestinal:  Negative for abdominal pain.   Genitourinary:  Negative for difficulty urinating.   Musculoskeletal:  Positive for arthralgias.   Neurological:  Negative for weakness and headaches.        Objective     Physical Exam  Constitutional:       Appearance: Normal appearance.   HENT:      Head: Normocephalic and atraumatic.   Cardiovascular:      Rate and Rhythm: Normal rate and regular rhythm.      Heart sounds: Normal heart sounds.   Pulmonary:      Effort: Pulmonary effort is normal.      Breath sounds: Normal breath sounds.   Abdominal:      Palpations: Abdomen is soft.      Tenderness: There is no abdominal tenderness.   Skin:     General: Skin is warm.   Neurological:      General: No focal deficit present.      Mental Status: She is alert and  oriented to person, place, and time.          Assessment and Plan     1. Tobacco use  -     Discontinue: albuterol (PROVENTIL/VENTOLIN HFA) 90 mcg/actuation inhaler; Inhale 1 puff into the lungs every 6 (six) hours as needed for Wheezing.  Dispense: 18 g; Refill: 1  -     albuterol (PROVENTIL/VENTOLIN HFA) 90 mcg/actuation inhaler; Inhale 1 puff into the lungs every 6 (six) hours as needed for Wheezing.  Dispense: 18 g; Refill: 1    2. Type 2 diabetes mellitus without complication, with long-term current use of insulin  -     Discontinue: amLODIPine (NORVASC) 10 MG tablet; Take 1 tablet (10 mg total) by mouth once daily.  Dispense: 30 tablet; Refill: 5  -     Discontinue: aspirin (ECOTRIN) 81 MG EC tablet; Take 1 tablet (81 mg total) by mouth once daily.  Dispense: 30 tablet; Refill: 5  -     Discontinue: atorvastatin (LIPITOR) 40 MG tablet; Take 1 tablet (40 mg total) by mouth once daily.  Dispense: 30 tablet; Refill: 5  -     Discontinue: cholecalciferol, vitamin D3, (VITAMIN D3) 25 mcg (1,000 unit) capsule; Take 1 capsule (1,000 Units total) by mouth once daily.  Dispense: 30 capsule; Refill: 5  -     Discontinue: ezetimibe (ZETIA) 10 mg tablet; Take 1 tablet (10 mg total) by mouth once daily.  Dispense: 30 tablet; Refill: 5  -     Discontinue: gabapentin (NEURONTIN) 300 MG capsule; Take 1 capsule (300 mg total) by mouth 3 (three) times daily.  Dispense: 90 capsule; Refill: 5  -     Discontinue: glipiZIDE (GLUCOTROL) 10 MG tablet; Take 1 tablet (10 mg total) by mouth once daily.  Dispense: 30 tablet; Refill: 5  -     Discontinue: LANTUS SOLOSTAR U-100 INSULIN 100 unit/mL (3 mL) InPn pen; 16 Units.  Dispense: 3 mL; Refill: 3  -     Discontinue: lisinopriL-hydrochlorothiazide (PRINZIDE,ZESTORETIC) 20-25 mg Tab; Take 1 tablet by mouth once daily.  Dispense: 30 tablet; Refill: 5  -     Discontinue: metFORMIN (GLUCOPHAGE) 1000 MG tablet; Take 1 tablet (1,000 mg total) by mouth 2 (two) times daily with meals.   Dispense: 60 tablet; Refill: 5  -     amLODIPine (NORVASC) 10 MG tablet; Take 1 tablet (10 mg total) by mouth once daily.  Dispense: 30 tablet; Refill: 5  -     aspirin (ECOTRIN) 81 MG EC tablet; Take 1 tablet (81 mg total) by mouth once daily.  Dispense: 30 tablet; Refill: 5  -     atorvastatin (LIPITOR) 40 MG tablet; Take 1 tablet (40 mg total) by mouth once daily.  Dispense: 30 tablet; Refill: 5  -     cholecalciferol, vitamin D3, (VITAMIN D3) 25 mcg (1,000 unit) capsule; Take 1 capsule (1,000 Units total) by mouth once daily.  Dispense: 30 capsule; Refill: 5  -     ezetimibe (ZETIA) 10 mg tablet; Take 1 tablet (10 mg total) by mouth once daily.  Dispense: 30 tablet; Refill: 5  -     gabapentin (NEURONTIN) 300 MG capsule; Take 1 capsule (300 mg total) by mouth 3 (three) times daily.  Dispense: 90 capsule; Refill: 5  -     glipiZIDE (GLUCOTROL) 10 MG tablet; Take 1 tablet (10 mg total) by mouth once daily.  Dispense: 30 tablet; Refill: 5  -     LANTUS SOLOSTAR U-100 INSULIN 100 unit/mL (3 mL) InPn pen; 16 Units.  Dispense: 3 mL; Refill: 3  -     lisinopriL-hydrochlorothiazide (PRINZIDE,ZESTORETIC) 20-25 mg Tab; Take 1 tablet by mouth once daily.  Dispense: 30 tablet; Refill: 5  -     metFORMIN (GLUCOPHAGE) 1000 MG tablet; Take 1 tablet (1,000 mg total) by mouth 2 (two) times daily with meals.  Dispense: 60 tablet; Refill: 5  -     Discontinue: OZEMPIC 0.25 mg or 0.5 mg (2 mg/3 mL) pen injector; Inject 0.5 mg into the skin every 7 days. SMARTSI.5 Milligram(s) SUB-Q Once a Week  Dispense: 3 mL; Refill: 5  -     Discontinue: OZEMPIC 0.25 mg or 0.5 mg (2 mg/3 mL) pen injector; Inject 0.5 mg into the skin every 7 days. SMARTSI.5 Milligram(s) SUB-Q Once a Week  Dispense: 3 mL; Refill: 5  -     OZEMPIC 0.25 mg or 0.5 mg (2 mg/3 mL) pen injector; Inject 0.5 mg into the skin every 7 days. SMARTSI.5 Milligram(s) SUB-Q Once a Week  Dispense: 3 mL; Refill: 5    Other orders  -     LIDOcaine (LIDODERM) 5 %; Place 1  patch onto the skin once daily. Remove & Discard patch within 12 hours or as directed by MD  Dispense: 30 patch; Refill: 0    Type 2 diabetes, insulin dependent   -patient currently taking 16 units of Lantus daily as well as glipizide 10 mg metformin 1000 mg, ozempic .25mg weekly  -A1c was 7.7 at last check, nighttime to repeat yet   -will increase Ozempic to 0.5 mg weekly, can continue to up titrate as needed  -continue gabapentin 300 mg for associated neuropathy    Osteoarthritis   -continue using patches, Tylenol as needed   -will prescribe lidocaine patches for patient to use, instructed patient she can take Tylenol arthritis more frequently than she is currently     Hypertension   -continue amlodipine 10 mg daily, lisinopril HCTZ 20-25 mg daily     Vitamin-D deficiency   -continue cholecalciferol 1000 units daily     Hyperlipidemia   -continue Lipitor 40 mg daily , Zetia 10 mg daily     Tobacco use   -recently restarted smoking, reaching out to smoking cessation program for more resources    Prescriptions provided for patient   RTC 6 months         Follow up in about 6 months (around 12/5/2024).  NIKKO

## 2024-10-29 DIAGNOSIS — E11.9 TYPE 2 DIABETES MELLITUS WITHOUT COMPLICATION, WITH LONG-TERM CURRENT USE OF INSULIN: ICD-10-CM

## 2024-10-29 DIAGNOSIS — Z79.4 TYPE 2 DIABETES MELLITUS WITHOUT COMPLICATION, WITH LONG-TERM CURRENT USE OF INSULIN: ICD-10-CM

## 2024-10-30 RX ORDER — VIT C/E/ZN/COPPR/LUTEIN/ZEAXAN 250MG-90MG
1000 CAPSULE ORAL DAILY
Qty: 90 CAPSULE | Refills: 3 | Status: SHIPPED | OUTPATIENT
Start: 2024-10-30 | End: 2025-10-30

## 2024-10-30 RX ORDER — AMLODIPINE BESYLATE 10 MG/1
10 TABLET ORAL DAILY
Qty: 90 TABLET | Refills: 3 | Status: SHIPPED | OUTPATIENT
Start: 2024-10-30 | End: 2025-10-30

## 2024-12-16 ENCOUNTER — OFFICE VISIT (OUTPATIENT)
Dept: INTERNAL MEDICINE | Facility: CLINIC | Age: 59
End: 2024-12-16
Payer: MEDICAID

## 2024-12-16 VITALS
DIASTOLIC BLOOD PRESSURE: 68 MMHG | HEART RATE: 60 BPM | RESPIRATION RATE: 18 BRPM | BODY MASS INDEX: 37.56 KG/M2 | HEIGHT: 64 IN | TEMPERATURE: 98 F | WEIGHT: 220 LBS | SYSTOLIC BLOOD PRESSURE: 152 MMHG | OXYGEN SATURATION: 97 %

## 2024-12-16 DIAGNOSIS — Z79.4 TYPE 2 DIABETES MELLITUS WITHOUT COMPLICATION, WITH LONG-TERM CURRENT USE OF INSULIN: Primary | ICD-10-CM

## 2024-12-16 DIAGNOSIS — E55.9 VITAMIN D DEFICIENCY: ICD-10-CM

## 2024-12-16 DIAGNOSIS — E78.5 HYPERLIPIDEMIA, UNSPECIFIED HYPERLIPIDEMIA TYPE: ICD-10-CM

## 2024-12-16 DIAGNOSIS — Z01.419 WELL WOMAN EXAM: ICD-10-CM

## 2024-12-16 DIAGNOSIS — G50.0 TRIGEMINAL NEURALGIA: ICD-10-CM

## 2024-12-16 DIAGNOSIS — I10 PRIMARY HYPERTENSION: ICD-10-CM

## 2024-12-16 DIAGNOSIS — E11.9 TYPE 2 DIABETES MELLITUS WITHOUT COMPLICATION, WITH LONG-TERM CURRENT USE OF INSULIN: Primary | ICD-10-CM

## 2024-12-16 DIAGNOSIS — Z72.0 TOBACCO USE: ICD-10-CM

## 2024-12-16 LAB — HBA1C MFR BLD: 9 %

## 2024-12-16 PROCEDURE — 83036 HEMOGLOBIN GLYCOSYLATED A1C: CPT | Mod: PBBFAC

## 2024-12-16 PROCEDURE — 99215 OFFICE O/P EST HI 40 MIN: CPT | Mod: PBBFAC

## 2024-12-16 NOTE — PROGRESS NOTES
[facility] INTERNAL MEDICINE CLINIC NOTE    Patient name: Jill Dey  YOB: 1965   MRN: [id number]  Appointment date: 12/17/2024  Appointment time: 09:35 AM    Subjective     HPI    Jill Dey is a 59 y.o.  female with PMH positive for obesity, tobacco use disorder, hypertension, hyperlipidemia, diabetes mellitus type II, who presented to IM clinic today for follow up. Patient states that overall she has been doing well since her last visit. Recently started smoking tobacco again due to increased social stressors in her personal life. Previously following with smoking cessation but now no longer following as she is not ready to quit again. Denies any other acute complaints.    Interval History    06/05/2024: She states she has been doing very well since her last visit. Has recently started smoking again and is reaching out to smoking cessation clinic for help with quitting. Patient states shoulder pain is biggest issue currently, she uses over-the-counter pain patches as needed and is requesting something else prescribed. Patient remains on low-dose Ozempic and has not had significant weight loss. Continues to take medicines for diabetes and A1c has remained stable. Patient increasing activity as tolerated and watching diet. Requesting prescriptions be printed out as patient has moved to [place] and is switching pharmacies     Past Medical History:  Past Medical History:   Diagnosis Date    Asthma     Diabetes mellitus, type 2     Hyperlipidemia     Hypertension      Past Surgical History:  Past Surgical History:   Procedure Laterality Date    COLONOSCOPY  08/20/2020    Dr. Arnaud Lim    HYSTERECTOMY       Family History:  Family History   Problem Relation Name Age of Onset    Hypertension Mother       Social History:  Social History     Tobacco Use    Smoking status: Every Day     Current packs/day: 0.50     Average packs/day: 0.5 packs/day for 1 year (0.5 ttl pk-yrs)      Types: Cigarettes     Start date: 2024     Last attempt to quit: 12/1/2023     Passive exposure: Never    Smokeless tobacco: Never   Substance Use Topics    Alcohol use: Yes     Comment: occasional    Drug use: Not Currently     Allergies:  Review of patient's allergies indicates:  No Known Allergies  Home Medications:  Prior to Admission medications    Medication Sig Start Date End Date Taking? Authorizing Provider   albuterol (PROVENTIL/VENTOLIN HFA) 90 mcg/actuation inhaler Inhale 1 puff into the lungs every 6 (six) hours as needed for Wheezing. 6/5/24 1/14/27 Yes Samuel Hendrix DO   amLODIPine (NORVASC) 10 MG tablet Take 1 tablet (10 mg total) by mouth once daily. 10/30/24 10/30/25 Yes Sage Maldonado MD   aspirin (ECOTRIN) 81 MG EC tablet Take 1 tablet (81 mg total) by mouth once daily. 6/5/24  Yes Samuel Hendrix DO   atorvastatin (LIPITOR) 40 MG tablet Take 1 tablet (40 mg total) by mouth once daily. 6/5/24  Yes Samuel Hendrix DO   blood sugar diagnostic Strp To check BG 1 times daily, to use with insurance preferred meter 3/11/24  Yes Samuel Hendrix DO   blood-glucose meter kit To check BG 1 times daily, to use with insurance preferred meter 3/11/24 3/11/25 Yes Samuel Hendrix DO   cholecalciferol, vitamin D3, (VITAMIN D3) 25 mcg (1,000 unit) capsule Take 1 capsule (1,000 Units total) by mouth once daily. 10/30/24 10/30/25 Yes Sage Maldonado MD   ezetimibe (ZETIA) 10 mg tablet Take 1 tablet (10 mg total) by mouth once daily. 6/5/24  Yes Samuel Hendrix DO   glipiZIDE (GLUCOTROL) 10 MG tablet Take 1 tablet (10 mg total) by mouth once daily. 6/5/24  Yes Samuel Hendrix DO   insulin syringe-needle U-100 1 mL 31 gauge x 5/16 Syrg 1 each by Misc.(Non-Drug; Combo Route) route once daily. 3/11/24  Yes Samuel Hendrix DO   lancets Misc To check BG 1 times daily, to use with insurance preferred meter 3/11/24  Yes Samuel Hendrix, DO CARSON SHAFER U-100 INSULIN 100 unit/mL (3 mL)  InPn pen 16 Units. 24  Yes Samuel Hendrix DO   LIDOcaine (LIDODERM) 5 % Place 1 patch onto the skin once daily. Remove & Discard patch within 12 hours or as directed by MD 24  Yes Samuel Hendrix DO   lisinopriL-hydrochlorothiazide (PRINZIDE,ZESTORETIC) 20-25 mg Tab Take 1 tablet by mouth once daily. 24  Yes Samuel Hendrix DO   MAGNESIUM L-LACTATE ORAL Take 1 tablet by mouth once daily.   Yes Provider, Historical   metFORMIN (GLUCOPHAGE) 1000 MG tablet Take 1 tablet (1,000 mg total) by mouth 2 (two) times daily with meals. 24  Yes Samuel Hendrix DO   nitroGLYCERIN (NITROSTAT) 0.4 MG SL tablet Place 0.4 mg under the tongue every 5 (five) minutes as needed for Chest pain. 21  Yes Provider, Historical   OZEMPIC 0.25 mg or 0.5 mg (2 mg/3 mL) pen injector Inject 0.5 mg into the skin every 7 days. SMARTSI.5 Milligram(s) SUB-Q Once a Week 24  Yes Samuel Hendrix DO   gabapentin (NEURONTIN) 300 MG capsule Take 1 capsule (300 mg total) by mouth 3 (three) times daily. 24  Samuel Hendrix DO     Review of Systems:  Review of Systems   Constitutional:  Negative for chills, diaphoresis, fatigue and fever.   HENT:  Negative for ear pain, hearing loss, rhinorrhea, sore throat, tinnitus and trouble swallowing.    Eyes:  Negative for pain, redness and visual disturbance.   Respiratory:  Negative for cough, chest tightness and shortness of breath.    Cardiovascular:  Negative for chest pain and palpitations.   Gastrointestinal:  Negative for abdominal pain, constipation, diarrhea, nausea and vomiting.   Genitourinary:  Negative for difficulty urinating, dysuria, frequency, hematuria and urgency.   Musculoskeletal:  Negative for arthralgias and myalgias.   Skin:  Negative for rash and wound.   Neurological:  Positive for headaches. Negative for dizziness, seizures, syncope, weakness, light-headedness and numbness.   Psychiatric/Behavioral:  Negative for confusion.         Objective     Vitals:   Vitals:    12/16/24 1122   BP: (!) 152/68   Pulse:    Resp:    Temp:        Physical Examination:   Physical Exam  Vitals reviewed.   Constitutional:       General: She is not in acute distress.     Appearance: Normal appearance. She is obese. She is not ill-appearing, toxic-appearing or diaphoretic.   HENT:      Head: Normocephalic and atraumatic.      Right Ear: External ear normal.      Left Ear: External ear normal.   Eyes:      General: No scleral icterus.        Right eye: No discharge.         Left eye: No discharge.      Extraocular Movements: Extraocular movements intact.      Conjunctiva/sclera: Conjunctivae normal.      Pupils: Pupils are equal, round, and reactive to light.   Cardiovascular:      Rate and Rhythm: Normal rate and regular rhythm.      Pulses: Normal pulses.      Heart sounds: Normal heart sounds. No murmur heard.     No friction rub. No gallop.   Pulmonary:      Effort: Pulmonary effort is normal. No respiratory distress.      Breath sounds: Normal breath sounds. No wheezing, rhonchi or rales.   Abdominal:      General: Abdomen is flat. Bowel sounds are normal. There is no distension.      Palpations: Abdomen is soft.      Tenderness: There is no abdominal tenderness. There is no guarding.   Musculoskeletal:         General: No swelling, tenderness, deformity or signs of injury. Normal range of motion.      Right lower leg: No edema.      Left lower leg: No edema.   Skin:     General: Skin is warm and dry.      Capillary Refill: Capillary refill takes less than 2 seconds.      Coloration: Skin is not jaundiced.      Findings: No bruising, erythema or rash.   Neurological:      Mental Status: She is alert.      Comments: AAO to person, place, time, and situation; CN II-XII grossly intact         PREVENTIVE CARE:  Lab Work:    DM (age>45 or HTN):  Lab Results   Component Value Date    HGBA1C 7.7 (H) 03/11/2024    ICZPZVE4I 9.0 12/16/2024    MICALBCREAT <19.3  "01/17/2023     Lipid :  Lab Results   Component Value Date    CHOL 137 03/11/2024    TRIG 159 (H) 03/11/2024    HDL 45 03/11/2024    LDL 60.00 03/11/2024     Thyroid:  No results found for: "TSH", "T4"  Screening:    Mammo: Mammogram (12/28/2023) showed scattered areas of fibroglandular density, group of amorphous calcifications in the slightly upper central right breast, middle depth, spanning approximately 5 mm, medially adjacent sparse group of amorphous calcifications in the slightly upper central right breast, middle depth, spanning approximately 3 mm. Core needle biopsy (03/07/2024) fibrocystic changes consisting of fibroadenomatoid change with multiple microcalcifications, cystic apocrine metaplasia, and benign ductal units but no evidence of carcinoma or atypia.  DEXA (age>65 or FRAX > 9.3%): DXA scan not indicated due to age and/or FRAX score  Lung Ca (30PY smoker age 55-79 or quit in last 15y): LDCT (12/12/2023) showed scattered lung nodules with one 2 mm subpleural nodule in right lower lobe and one pleural based 2 mm nodule to left lower lobe.  Colon Ca: (age 45-75-annual FOBT, 5y flex + FOBTq3 or 10y c-scope): Colonoscopy performed (08/20/2020). Repeat colonoscopy recommended in 10 years.  AAA (smoker 65-76): Not indicated due to assigned sex  PAP (<65y.o): Referred to [facility] for annual well woman    ID Titers and Vaccinations:    Immunization History   Administered Date(s) Administered    COVID-19, MRNA, LN-S, PF (MODERNA FULL 0.5 ML DOSE) 06/10/2021, 07/08/2021, 01/11/2022    Influenza 10/24/2012    Influenza - Quadrivalent 12/24/2019, 12/23/2020, 10/27/2021    Influenza - Quadrivalent - PF (6-35 months) 10/04/2018    Influenza - Quadrivalent - PF *Preferred* (6 months and older) 11/19/2022, 11/18/2023    Influenza - Trivalent - Fluarix, Flulaval, Fluzone, Afluria - PF 12/14/2011    Pneumococcal Conjugate - 20 Valent 11/18/2023    Tdap 06/14/2018, 02/01/2022    Zoster 10/27/2021    Zoster Recombinant " "10/27/2021, 02/16/2022      HIV (once age 15-65):  No results found for: "HIV1X2", "[id number]"     Hepatitis Screening:   Lab Results   Component Value Date    HEPCAB Nonreactive 07/22/2020      Pneumococcal vaccine (65 and over or high risk): UTD  Influenza Vaccine: UTD  Tetanus: UTD  Zoster vaccination (> 50y.o): UTD  Covid vaccine: 3 doses Pfizer    ASSESSMENT/PLAN:    Obesity  Diabetes mellitus type II  POCT A1c 9.0  -Last A1c 9.2  -will perform diabetic eye exam next visit  -will perform diabetic foot exam next visit  -increased ozempic from 0.5 to 1 mg qweekly  -continue lantus 16 units qhs, metformin 1000 mg bid, glipizide 10 mg qd  -referred to [facility] nutrition and diabetic education    Hypertension  -/68  -goal bp < 130/80  -bp not at goal  -patient states she has been out of antihypertensive medications for a few weeks  -continue amlodipine 10 mg qd and lisinopril-hydrochlorothiazide 20-25 mg qd  -will bring patient back for nurse visit for repeat bp check in 3 weeks    Hyperlipidemia  The 10-year ASCVD risk score (Jose DK, et al., 2019) is: 36.7%    Values used to calculate the score:      Age: 59 years      Sex: Female      Is Non- : Yes      Diabetic: Yes      Tobacco smoker: Yes      Systolic Blood Pressure: 152 mmHg      Is BP treated: Yes      HDL Cholesterol: 45 mg/dL      Total Cholesterol: 137 mg/dL  -goal LDL < 70 given hx of diabetes  -LDL at goal  -continue atorvastatin 40 mg qd, zetia 10 mg qd, and aspirin 81 mg qd    Vitamin D deficiency  Vitamin D 25.8  -continue cholecalciferol 1000 units daily  -will recheck vitamin d before next visit    Tobacco use disorder  -continues to smoke tobacco  -not interested in quitting at this time  -will refer back to smoking cessation when ready as patient has previously followed and quit in past    Health Maintenance:  -lab work ordered  -initiated and/or refilled medications as above  -screenings ordered and/or UTD as " above  -vaccinations as above    Future Appointments   Date Time Provider Department Center   1/8/2025  2:00 PM NURSE, [facility] INTERNAL MEDICINE RESIDENTS [facility] IM RES [place]   3/13/2025  9:10 AM Sage Maldonado MD [facility] IM RES [place]       Sage Maldonado MD  [facility] Internal Medicine -II

## 2024-12-17 RX ORDER — EZETIMIBE 10 MG/1
10 TABLET ORAL DAILY
Qty: 30 TABLET | Refills: 5 | Status: SHIPPED | OUTPATIENT
Start: 2024-12-17 | End: 2024-12-17 | Stop reason: SDUPTHER

## 2024-12-17 RX ORDER — SEMAGLUTIDE 1.34 MG/ML
1 INJECTION, SOLUTION SUBCUTANEOUS
Qty: 3 ML | Refills: 2 | Status: SHIPPED | OUTPATIENT
Start: 2024-12-17 | End: 2025-03-17

## 2024-12-17 RX ORDER — GABAPENTIN 300 MG/1
300 CAPSULE ORAL 3 TIMES DAILY
Qty: 270 CAPSULE | Refills: 3 | Status: SHIPPED | OUTPATIENT
Start: 2024-12-17 | End: 2024-12-17

## 2024-12-17 RX ORDER — INSULIN GLARGINE 100 [IU]/ML
16 INJECTION, SOLUTION SUBCUTANEOUS NIGHTLY
Qty: 14.4 ML | Refills: 3 | Status: SHIPPED | OUTPATIENT
Start: 2024-12-17 | End: 2025-12-17

## 2024-12-17 RX ORDER — LISINOPRIL AND HYDROCHLOROTHIAZIDE 20; 25 MG/1; MG/1
1 TABLET ORAL DAILY
Qty: 90 TABLET | Refills: 3 | Status: SHIPPED | OUTPATIENT
Start: 2024-12-17 | End: 2025-12-17

## 2024-12-17 RX ORDER — ASPIRIN 81 MG/1
81 TABLET ORAL DAILY
Qty: 30 TABLET | Refills: 5 | Status: SHIPPED | OUTPATIENT
Start: 2024-12-17 | End: 2024-12-17 | Stop reason: SDUPTHER

## 2024-12-17 RX ORDER — AMLODIPINE BESYLATE 10 MG/1
10 TABLET ORAL DAILY
Qty: 90 TABLET | Refills: 3 | Status: SHIPPED | OUTPATIENT
Start: 2024-12-17 | End: 2025-12-17

## 2024-12-17 RX ORDER — METFORMIN HYDROCHLORIDE 1000 MG/1
1000 TABLET ORAL 2 TIMES DAILY WITH MEALS
Qty: 60 TABLET | Refills: 5 | Status: SHIPPED | OUTPATIENT
Start: 2024-12-17 | End: 2024-12-17 | Stop reason: SDUPTHER

## 2024-12-17 RX ORDER — VIT C/E/ZN/COPPR/LUTEIN/ZEAXAN 250MG-90MG
1000 CAPSULE ORAL DAILY
Qty: 90 CAPSULE | Refills: 3 | Status: SHIPPED | OUTPATIENT
Start: 2024-12-17 | End: 2025-12-17

## 2024-12-17 RX ORDER — ASPIRIN 81 MG/1
81 TABLET ORAL DAILY
Qty: 90 TABLET | Refills: 3 | Status: SHIPPED | OUTPATIENT
Start: 2024-12-17 | End: 2025-12-17

## 2024-12-17 RX ORDER — METFORMIN HYDROCHLORIDE 1000 MG/1
1000 TABLET ORAL 2 TIMES DAILY WITH MEALS
Qty: 180 TABLET | Refills: 3 | Status: SHIPPED | OUTPATIENT
Start: 2024-12-17 | End: 2025-12-17

## 2024-12-17 RX ORDER — EZETIMIBE 10 MG/1
10 TABLET ORAL DAILY
Qty: 90 TABLET | Refills: 3 | Status: SHIPPED | OUTPATIENT
Start: 2024-12-17 | End: 2025-12-17

## 2024-12-17 RX ORDER — GLIPIZIDE 10 MG/1
10 TABLET ORAL DAILY
Qty: 90 TABLET | Refills: 3 | Status: SHIPPED | OUTPATIENT
Start: 2024-12-17 | End: 2025-12-17

## 2024-12-17 RX ORDER — GABAPENTIN 300 MG/1
300 CAPSULE ORAL 3 TIMES DAILY
Qty: 90 CAPSULE | Refills: 5 | Status: SHIPPED | OUTPATIENT
Start: 2024-12-17 | End: 2024-12-17 | Stop reason: SDUPTHER

## 2024-12-17 RX ORDER — INSULIN GLARGINE 100 [IU]/ML
INJECTION, SOLUTION SUBCUTANEOUS
Qty: 3 ML | Refills: 3 | Status: SHIPPED | OUTPATIENT
Start: 2024-12-17 | End: 2024-12-17 | Stop reason: SDUPTHER

## 2024-12-17 RX ORDER — NITROGLYCERIN 0.4 MG/1
0.4 TABLET SUBLINGUAL EVERY 5 MIN PRN
Qty: 15 TABLET | Refills: 0 | Status: SHIPPED | OUTPATIENT
Start: 2024-12-17 | End: 2025-01-01

## 2024-12-17 RX ORDER — ATORVASTATIN CALCIUM 40 MG/1
40 TABLET, FILM COATED ORAL DAILY
Qty: 30 TABLET | Refills: 5 | Status: SHIPPED | OUTPATIENT
Start: 2024-12-17 | End: 2024-12-17 | Stop reason: SDUPTHER

## 2024-12-17 RX ORDER — ACETAMINOPHEN 500 MG
1 TABLET ORAL DAILY
Qty: 1 EACH | Refills: 0 | Status: SHIPPED | OUTPATIENT
Start: 2024-12-17 | End: 2025-12-17

## 2024-12-17 RX ORDER — GLIPIZIDE 10 MG/1
10 TABLET ORAL DAILY
Qty: 30 TABLET | Refills: 5 | Status: SHIPPED | OUTPATIENT
Start: 2024-12-17 | End: 2024-12-17 | Stop reason: SDUPTHER

## 2024-12-17 RX ORDER — LISINOPRIL AND HYDROCHLOROTHIAZIDE 20; 25 MG/1; MG/1
1 TABLET ORAL DAILY
Qty: 30 TABLET | Refills: 5 | Status: SHIPPED | OUTPATIENT
Start: 2024-12-17 | End: 2024-12-17 | Stop reason: SDUPTHER

## 2024-12-17 RX ORDER — ATORVASTATIN CALCIUM 40 MG/1
40 TABLET, FILM COATED ORAL DAILY
Qty: 90 TABLET | Refills: 3 | Status: SHIPPED | OUTPATIENT
Start: 2024-12-17 | End: 2025-12-17

## 2025-01-28 DIAGNOSIS — E78.5 HYPERLIPIDEMIA, UNSPECIFIED HYPERLIPIDEMIA TYPE: ICD-10-CM

## 2025-02-02 RX ORDER — NITROGLYCERIN 0.4 MG/1
0.4 TABLET SUBLINGUAL EVERY 5 MIN PRN
Qty: 15 TABLET | Refills: 0 | Status: SHIPPED | OUTPATIENT
Start: 2025-02-02 | End: 2025-02-17

## 2025-02-02 NOTE — TELEPHONE ENCOUNTER
Attempted to contact patient regarding refill of sublingual nitro after it was refilled one month ago but patient phone number listed is a number that is not in service. Will refill medication for now and have office attempt to contact further.

## 2025-03-06 ENCOUNTER — OFFICE VISIT (OUTPATIENT)
Dept: GYNECOLOGY | Facility: CLINIC | Age: 60
End: 2025-03-06
Payer: MEDICAID

## 2025-03-06 VITALS
RESPIRATION RATE: 18 BRPM | HEIGHT: 64 IN | TEMPERATURE: 98 F | WEIGHT: 220.38 LBS | HEART RATE: 62 BPM | SYSTOLIC BLOOD PRESSURE: 136 MMHG | BODY MASS INDEX: 37.62 KG/M2 | OXYGEN SATURATION: 98 % | DIASTOLIC BLOOD PRESSURE: 75 MMHG

## 2025-03-06 DIAGNOSIS — Z12.31 ENCOUNTER FOR SCREENING MAMMOGRAM FOR BREAST CANCER: ICD-10-CM

## 2025-03-06 DIAGNOSIS — Z01.419 WELL WOMAN EXAM WITH ROUTINE GYNECOLOGICAL EXAM: Primary | ICD-10-CM

## 2025-03-06 DIAGNOSIS — Z72.0 TOBACCO ABUSE: ICD-10-CM

## 2025-03-06 PROCEDURE — 1159F MED LIST DOCD IN RCRD: CPT | Mod: CPTII,,,

## 2025-03-06 PROCEDURE — 99386 PREV VISIT NEW AGE 40-64: CPT | Mod: S$PBB,,,

## 2025-03-06 PROCEDURE — 3075F SYST BP GE 130 - 139MM HG: CPT | Mod: CPTII,,,

## 2025-03-06 PROCEDURE — 99215 OFFICE O/P EST HI 40 MIN: CPT | Mod: PBBFAC

## 2025-03-06 PROCEDURE — 4010F ACE/ARB THERAPY RXD/TAKEN: CPT | Mod: CPTII,,,

## 2025-03-06 PROCEDURE — 3078F DIAST BP <80 MM HG: CPT | Mod: CPTII,,,

## 2025-03-06 PROCEDURE — 3008F BODY MASS INDEX DOCD: CPT | Mod: CPTII,,,

## 2025-03-06 NOTE — PROGRESS NOTES
[facility] -  Gynecology / Women's Health Clinic     Subjective:      Patient ID: Jill Dey is a 60 y.o. female.    Chief Complaint:  Gynecologic Exam      History of Present Illness:  The patient  here for annual exam. Hx of Hysterectomy d/t AUB-L 30yrs ago per patient. Denies history of abnormal paps. MG- 23 & BIRADS 4; followed by Rt breast biopsy 3/2024 benign, recommendations annual screening. Denies breast or urinary complaints. Denies pelvic pain, abnormal bleeding or discharge. Pt reports no STIs in the past and no concerns. Admits not sexually active. Admits tobacco use. Denies fly hx of ovarian, uterine or colon cancer. MGM with breast cancer. Colonoscopy  repeat 10 yrs.     GYN & OB History:  No LMP recorded. Patient has had a hysterectomy.     OB History    Para Term  AB Living   4 3 3  1 3   SAB IAB Ectopic Multiple Live Births   1    3      # Outcome Date GA Lbr Ramakrishna/2nd Weight Sex Type Anes PTL Lv   4 SAB      SAB   FD   3 Term      Vag-Spont   HAILY   2 Term      Vag-Spont   HAILY   1 Term      Vag-Spont   HAILY       Past Medical History:   Diagnosis Date    Asthma     Diabetes mellitus, type 2     Hyperlipidemia     Hypertension         Past Surgical History:   Procedure Laterality Date    BREAST BIOPSY Right 2024    COLONOSCOPY  2020    Dr. Arnaud Lim    HYSTERECTOMY          Social History     Tobacco Use    Smoking status: Every Day     Current packs/day: 0.50     Average packs/day: 0.5 packs/day for 1.2 years (0.6 ttl pk-yrs)     Types: Cigarettes     Start date:      Last attempt to quit: 2023     Passive exposure: Current    Smokeless tobacco: Never   Substance and Sexual Activity    Alcohol use: Yes     Comment: occasional    Drug use: Not Currently    Sexual activity: Not Currently        Current Outpatient Medications   Medication Instructions    albuterol (PROVENTIL/VENTOLIN HFA) 90 mcg/actuation inhaler 1  "puff, Inhalation, Every 6 hours PRN    amLODIPine (NORVASC) 10 mg, Oral, Daily    aspirin (ECOTRIN) 81 mg, Oral, Daily    atorvastatin (LIPITOR) 40 mg, Oral, Daily    blood pressure monitor (BLOOD PRESSURE KIT) Kit 1 each, Misc.(Non-Drug; Combo Route), Daily    blood sugar diagnostic Strp To check BG 1 times daily, to use with insurance preferred meter    blood-glucose meter kit To check BG 1 times daily, to use with insurance preferred meter    cholecalciferol (vitamin D3) (VITAMIN D3) 1,000 Units, Oral, Daily    ezetimibe (ZETIA) 10 mg, Oral, Daily    glipiZIDE (GLUCOTROL) 10 mg, Oral, Daily    insulin syringe-needle U-100 1 mL 31 gauge x 5/16 Syrg 1 each, Misc.(Non-Drug; Combo Route), Daily    lancets Misc To check BG 1 times daily, to use with insurance preferred meter    LANTUS SOLOSTAR U-100 INSULIN 16 Units, Subcutaneous, Nightly    LIDOcaine (LIDODERM) 5 % 1 patch, Transdermal, Daily, Remove & Discard patch within 12 hours or as directed by MD    lisinopriL-hydrochlorothiazide (PRINZIDE,ZESTORETIC) 20-25 mg Tab 1 tablet, Oral, Daily    MAGNESIUM L-LACTATE ORAL 1 tablet, Daily    metFORMIN (GLUCOPHAGE) 1,000 mg, Oral, 2 times daily with meals    nitroGLYCERIN (NITROSTAT) 0.4 mg, Sublingual, Every 5 min PRN    OZEMPIC 1 mg, Subcutaneous, Every 7 days       Review of patient's allergies indicates:  No Known Allergies      Review of Systems:  Review of Systems  Negative except for pertinent findings for positives per HPI.     Objective:   Physical Exam   Visit Vitals  /75   Pulse 62   Temp 98.3 °F (36.8 °C) (Oral)   Resp 18   Ht 5' 4" (1.626 m)   Wt 100 kg (220 lb 6.4 oz)   SpO2 98%   BMI 37.83 kg/m²       GENERAL: Well-developed female in no acute distress.  SKIN: Normal to inspection,warm, dry and intact.  BREASTS: No rashes or erythema. No masses, lumps, discharge, tenderness.  VULVA: General appearance WNL; external genitalia with no lesions or erythema.  BIMANUAL EXAM: Vaginal mucosa/vault atrophic, " Vaginal cuff intact. Uterus/Cervix surgically absent. Bilateral adnexa reveal no tenderness.  PSYCHIATRIC: Patient is oriented to person, place, and time. Mood and affect are normal.    Assessment:       ICD-10-CM ICD-9-CM   1. Well woman exam with routine gynecological exam  Z01.419 V72.31   2. Encounter for screening mammogram for breast cancer  Z12.31 V76.12   3. Tobacco abuse  Z72.0 305.1       Plan:     1. Well woman exam with routine gynecological exam    2. Encounter for screening mammogram for breast cancer  -     Mammo Digital Screening Bilat w/ Ashish (XPD); Future; Expected date: 03/06/2025    3. Tobacco abuse    Pelvic exam today, pap deferred d/t hysterectomy of benign causes  MG ordered    Smoking cessation counseling provided. Instructed on smoking cessation program through [facility] and pharmacological interventions to aid in cessation.    Call with any GYN concerns    Follow up in about 1 year (around 3/6/2026) for Annual.

## 2025-03-11 DIAGNOSIS — Z72.0 TOBACCO USE: ICD-10-CM

## 2025-03-11 RX ORDER — ASPIRIN 81 MG/1
81 TABLET ORAL DAILY
Qty: 90 TABLET | Refills: 3 | Status: SHIPPED | OUTPATIENT
Start: 2025-03-11 | End: 2026-03-11

## 2025-03-12 DIAGNOSIS — E11.9 TYPE 2 DIABETES MELLITUS WITHOUT COMPLICATION, WITH LONG-TERM CURRENT USE OF INSULIN: Primary | ICD-10-CM

## 2025-03-12 DIAGNOSIS — Z79.4 TYPE 2 DIABETES MELLITUS WITHOUT COMPLICATION, WITH LONG-TERM CURRENT USE OF INSULIN: Primary | ICD-10-CM

## 2025-03-13 ENCOUNTER — OFFICE VISIT (OUTPATIENT)
Dept: INTERNAL MEDICINE | Facility: CLINIC | Age: 60
End: 2025-03-13
Payer: MEDICAID

## 2025-03-13 VITALS
WEIGHT: 223 LBS | SYSTOLIC BLOOD PRESSURE: 144 MMHG | DIASTOLIC BLOOD PRESSURE: 66 MMHG | HEIGHT: 64 IN | TEMPERATURE: 98 F | BODY MASS INDEX: 38.07 KG/M2 | RESPIRATION RATE: 20 BRPM | OXYGEN SATURATION: 97 % | HEART RATE: 62 BPM

## 2025-03-13 DIAGNOSIS — Z79.4 TYPE 2 DIABETES MELLITUS WITHOUT COMPLICATION, WITH LONG-TERM CURRENT USE OF INSULIN: Primary | ICD-10-CM

## 2025-03-13 DIAGNOSIS — Z12.2 SCREENING FOR LUNG CANCER: ICD-10-CM

## 2025-03-13 DIAGNOSIS — E78.5 HYPERLIPIDEMIA, UNSPECIFIED HYPERLIPIDEMIA TYPE: ICD-10-CM

## 2025-03-13 DIAGNOSIS — E11.9 TYPE 2 DIABETES MELLITUS WITHOUT COMPLICATION, WITH LONG-TERM CURRENT USE OF INSULIN: Primary | ICD-10-CM

## 2025-03-13 DIAGNOSIS — I10 HYPERTENSION, UNSPECIFIED TYPE: ICD-10-CM

## 2025-03-13 DIAGNOSIS — E55.9 VITAMIN D DEFICIENCY: ICD-10-CM

## 2025-03-13 LAB — HBA1C MFR BLD: 10.3 %

## 2025-03-13 PROCEDURE — 99214 OFFICE O/P EST MOD 30 MIN: CPT | Mod: PBBFAC

## 2025-03-13 PROCEDURE — 83036 HEMOGLOBIN GLYCOSYLATED A1C: CPT | Mod: PBBFAC

## 2025-03-13 RX ORDER — GLIPIZIDE 10 MG/1
10 TABLET ORAL DAILY
Qty: 90 TABLET | Refills: 3 | Status: SHIPPED | OUTPATIENT
Start: 2025-03-13 | End: 2026-03-13

## 2025-03-13 RX ORDER — SEMAGLUTIDE 1.34 MG/ML
1 INJECTION, SOLUTION SUBCUTANEOUS
Qty: 3 ML | Refills: 2 | Status: SHIPPED | OUTPATIENT
Start: 2025-03-13 | End: 2025-06-11

## 2025-03-13 RX ORDER — METFORMIN HYDROCHLORIDE 1000 MG/1
1000 TABLET ORAL 2 TIMES DAILY WITH MEALS
Qty: 180 TABLET | Refills: 3 | Status: SHIPPED | OUTPATIENT
Start: 2025-03-13 | End: 2026-03-13

## 2025-03-13 RX ORDER — ATORVASTATIN CALCIUM 40 MG/1
40 TABLET, FILM COATED ORAL DAILY
Qty: 90 TABLET | Refills: 3 | Status: SHIPPED | OUTPATIENT
Start: 2025-03-13 | End: 2026-03-13

## 2025-03-13 RX ORDER — LISINOPRIL 40 MG/1
40 TABLET ORAL DAILY
Qty: 90 TABLET | Refills: 3 | Status: SHIPPED | OUTPATIENT
Start: 2025-03-13 | End: 2026-03-13

## 2025-03-13 RX ORDER — HYDROCHLOROTHIAZIDE 25 MG/1
25 TABLET ORAL DAILY
Qty: 30 TABLET | Refills: 11 | Status: SHIPPED | OUTPATIENT
Start: 2025-03-13 | End: 2026-03-13

## 2025-03-13 RX ORDER — NITROGLYCERIN 0.4 MG/1
0.4 TABLET SUBLINGUAL EVERY 5 MIN PRN
Qty: 15 TABLET | Refills: 0 | Status: SHIPPED | OUTPATIENT
Start: 2025-03-13 | End: 2025-03-28

## 2025-03-13 RX ORDER — EZETIMIBE 10 MG/1
10 TABLET ORAL DAILY
Qty: 90 TABLET | Refills: 3 | Status: SHIPPED | OUTPATIENT
Start: 2025-03-13 | End: 2026-03-13

## 2025-03-13 RX ORDER — INSULIN PUMP SYRINGE, 3 ML
EACH MISCELLANEOUS
Qty: 1 EACH | Refills: 0 | Status: SHIPPED | OUTPATIENT
Start: 2025-03-13 | End: 2026-03-13

## 2025-03-13 RX ORDER — LISINOPRIL AND HYDROCHLOROTHIAZIDE 20; 25 MG/1; MG/1
1 TABLET ORAL DAILY
Qty: 90 TABLET | Refills: 3 | Status: CANCELLED | OUTPATIENT
Start: 2025-03-13 | End: 2026-03-13

## 2025-03-13 RX ORDER — ACETAMINOPHEN 500 MG
1 TABLET ORAL DAILY
Qty: 14 EACH | Refills: 0 | Status: SHIPPED | OUTPATIENT
Start: 2025-03-13 | End: 2026-03-13

## 2025-03-13 RX ORDER — AMLODIPINE BESYLATE 10 MG/1
10 TABLET ORAL DAILY
Qty: 90 TABLET | Refills: 3 | Status: SHIPPED | OUTPATIENT
Start: 2025-03-13 | End: 2026-03-13

## 2025-03-13 NOTE — PROGRESS NOTES
\[facility]\"" INTERNAL MEDICINE CLINIC NOTE    Patient name: Jill Dey  YOB: 1965   MRN: [id number]  Appointment date: 3/13/2025  Appointment time: 08:55 AM    Subjective     HPI    Jill Dey is a 60 y.o.  female with PMH positive for obesity, tobacco use disorder, hypertension, hyperlipidemia, diabetes mellitus type II, who presented to IM clinic today for follow up. Patient states that overall she has been doing well since her last visit. Patient today denies any acute complaints however reports that she has been out of her diabetic medications as her old pharmacy was acquired by a new pharmacy and the new pharmacy is not refilled her medications. She otherwise continues to smoke 0.5 ppd after quitting briefly for approximately 3-4 months in 2024. Previously following with smoking cessation but now no longer following as she is not ready to quit again. Denies any other acute complaints.    Interval History    06/05/2024: She states she has been doing very well since her last visit. Has recently started smoking again and is reaching out to smoking cessation clinic for help with quitting. Patient states shoulder pain is biggest issue currently, she uses over-the-counter pain patches as needed and is requesting something else prescribed. Patient remains on low-dose Ozempic and has not had significant weight loss. Continues to take medicines for diabetes and A1c has remained stable. Patient increasing activity as tolerated and watching diet. Requesting prescriptions be printed out as patient has moved to [place] and is switching pharmacies     Past Medical History:  Past Medical History:   Diagnosis Date    Asthma     Diabetes mellitus, type 2     Hyperlipidemia     Hypertension      Past Surgical History:  Past Surgical History:   Procedure Laterality Date    BREAST BIOPSY Right 03/08/2024    COLONOSCOPY  08/20/2020    Dr. Arnaud Lim    HYSTERECTOMY       Family  History:  Family History   Problem Relation Name Age of Onset    Breast cancer Maternal Grandmother      Leukemia Father      Hypertension Mother       Social History:  Social History     Tobacco Use    Smoking status: Every Day     Current packs/day: 0.50     Average packs/day: 0.5 packs/day for 1.2 years (0.6 ttl pk-yrs)     Types: Cigarettes     Start date: 2024     Last attempt to quit: 12/1/2023     Passive exposure: Current    Smokeless tobacco: Never   Substance Use Topics    Alcohol use: Yes     Alcohol/week: 2.0 standard drinks of alcohol     Types: 1 Glasses of wine, 1 Cans of beer per week     Comment: occasional    Drug use: Not Currently     Allergies:  Review of patient's allergies indicates:  No Known Allergies  Home Medications:  Prior to Admission medications    Medication Sig Start Date End Date Taking? Authorizing Provider   albuterol (PROVENTIL/VENTOLIN HFA) 90 mcg/actuation inhaler Inhale 1 puff into the lungs every 6 (six) hours as needed for Wheezing. 6/5/24 1/14/27 Yes Samuel Hendrix DO   amLODIPine (NORVASC) 10 MG tablet Take 1 tablet (10 mg total) by mouth once daily. 10/30/24 10/30/25 Yes Sage Maldonado MD   aspirin (ECOTRIN) 81 MG EC tablet Take 1 tablet (81 mg total) by mouth once daily. 6/5/24  Yes Samuel Hendrix DO   atorvastatin (LIPITOR) 40 MG tablet Take 1 tablet (40 mg total) by mouth once daily. 6/5/24  Yes Samuel Hendrix DO   blood sugar diagnostic Strp To check BG 1 times daily, to use with insurance preferred meter 3/11/24  Yes Samuel Hendrix DO   blood-glucose meter kit To check BG 1 times daily, to use with insurance preferred meter 3/11/24 3/11/25 Yes Samuel Hendrix DO   cholecalciferol, vitamin D3, (VITAMIN D3) 25 mcg (1,000 unit) capsule Take 1 capsule (1,000 Units total) by mouth once daily. 10/30/24 10/30/25 Yes Sage Maldonado MD   ezetimibe (ZETIA) 10 mg tablet Take 1 tablet (10 mg total) by mouth once daily. 6/5/24  Yes aSmuel Hendrix DO    glipiZIDE (GLUCOTROL) 10 MG tablet Take 1 tablet (10 mg total) by mouth once daily. 24  Yes Samuel Hendrix DO   insulin syringe-needle U-100 1 mL 31 gauge x / Syrg 1 each by Misc.(Non-Drug; Combo Route) route once daily. 3/11/24  Yes Samuel Hendrix DO   lancets Misc To check BG 1 times daily, to use with insurance preferred meter 3/11/24  Yes Samuel Hendrix DO   LANTUS SOLOSTAR U-100 INSULIN 100 unit/mL (3 mL) InPn pen 16 Units. 24  Yes Samuel Hendrix DO   LIDOcaine (LIDODERM) 5 % Place 1 patch onto the skin once daily. Remove & Discard patch within 12 hours or as directed by MD 24  Yes Samuel Hendrix DO   lisinopriL-hydrochlorothiazide (PRINZIDE,ZESTORETIC) 20-25 mg Tab Take 1 tablet by mouth once daily. 24  Yes Samuel Hendrix DO   MAGNESIUM L-LACTATE ORAL Take 1 tablet by mouth once daily.   Yes Provider, Historical   metFORMIN (GLUCOPHAGE) 1000 MG tablet Take 1 tablet (1,000 mg total) by mouth 2 (two) times daily with meals. 24  Yes Samuel Hendrix DO   nitroGLYCERIN (NITROSTAT) 0.4 MG SL tablet Place 0.4 mg under the tongue every 5 (five) minutes as needed for Chest pain. 21  Yes Provider, Historical   OZEMPIC 0.25 mg or 0.5 mg (2 mg/3 mL) pen injector Inject 0.5 mg into the skin every 7 days. SMARTSI.5 Milligram(s) SUB-Q Once a Week 24  Yes Samuel Hendrix DO   gabapentin (NEURONTIN) 300 MG capsule Take 1 capsule (300 mg total) by mouth 3 (three) times daily. 24  Samuel Hendrix DO     Review of Systems:  Review of Systems   Constitutional:  Negative for chills, diaphoresis, fatigue and fever.   HENT:  Negative for ear pain, hearing loss, rhinorrhea, sore throat, tinnitus and trouble swallowing.    Eyes:  Negative for pain, redness and visual disturbance.   Respiratory:  Negative for cough, chest tightness and shortness of breath.    Cardiovascular:  Negative for chest pain and palpitations.   Gastrointestinal:  Negative for  abdominal pain, constipation, diarrhea, nausea and vomiting.   Genitourinary:  Negative for difficulty urinating, dysuria, frequency, hematuria and urgency.   Musculoskeletal:  Negative for arthralgias and myalgias.   Skin:  Negative for rash and wound.   Neurological:  Negative for dizziness, seizures, syncope, weakness, light-headedness, numbness and headaches.   Psychiatric/Behavioral:  Negative for confusion.        Objective     Vitals:   Vitals:    03/13/25 0950   BP: (!) 143/68   Pulse: 62   Resp: 20   Temp: 98.4 °F (36.9 °C)       Physical Examination:   Physical Exam  Vitals reviewed.   Constitutional:       General: She is not in acute distress.     Appearance: Normal appearance. She is obese. She is not ill-appearing, toxic-appearing or diaphoretic.   HENT:      Head: Normocephalic and atraumatic.      Right Ear: External ear normal.      Left Ear: External ear normal.   Eyes:      General: No scleral icterus.        Right eye: No discharge.         Left eye: No discharge.      Extraocular Movements: Extraocular movements intact.      Conjunctiva/sclera: Conjunctivae normal.      Pupils: Pupils are equal, round, and reactive to light.   Cardiovascular:      Rate and Rhythm: Normal rate and regular rhythm.      Pulses: Normal pulses.      Heart sounds: Normal heart sounds. No murmur heard.     No friction rub. No gallop.   Pulmonary:      Effort: Pulmonary effort is normal. No respiratory distress.      Breath sounds: Normal breath sounds. No wheezing, rhonchi or rales.   Abdominal:      General: Abdomen is flat. Bowel sounds are normal. There is no distension.      Palpations: Abdomen is soft.      Tenderness: There is no abdominal tenderness. There is no guarding.   Musculoskeletal:         General: No swelling, tenderness, deformity or signs of injury. Normal range of motion.      Right lower leg: No edema.      Left lower leg: No edema.   Skin:     General: Skin is warm and dry.      Capillary Refill:  "Capillary refill takes less than 2 seconds.      Coloration: Skin is not jaundiced.      Findings: No bruising, erythema or rash.   Neurological:      Mental Status: She is alert.      Comments: AAO to person, place, time, and situation; CN II-XII grossly intact       PREVENTIVE CARE:  Lab Work:    DM (age>45 or HTN):  Lab Results   Component Value Date    HGBA1C 7.7 (H) 03/11/2024    LLDGOOI6B 9.0 12/16/2024    MICALBCREAT <19.3 01/17/2023     Lipid :  Lab Results   Component Value Date    CHOL 137 03/11/2024    TRIG 159 (H) 03/11/2024    HDL 45 03/11/2024    LDL 60.00 03/11/2024     Thyroid:  No results found for: "TSH", "T4"  Screening:    Mammo: Mammogram (12/28/2023) showed scattered areas of fibroglandular density, group of amorphous calcifications in the slightly upper central right breast, middle depth, spanning approximately 5 mm, medially adjacent sparse group of amorphous calcifications in the slightly upper central right breast, middle depth, spanning approximately 3 mm. Core needle biopsy (03/07/2024) fibrocystic changes consisting of fibroadenomatoid change with multiple microcalcifications, cystic apocrine metaplasia, and benign ductal units but no evidence of carcinoma or atypia. Repeat mammogram ordered.   DEXA (age>65 or FRAX > 9.3%): DXA scan not indicated due to age and/or FRAX score  Lung Ca (30PY smoker age 55-79 or quit in last 15y): LDCT (12/12/2023) showed scattered lung nodules with one 2 mm subpleural nodule in right lower lobe and one pleural based 2 mm nodule to left lower lobe.  Colon Ca: (age 45-75-annual FOBT, 5y flex + FOBTq3 or 10y c-scope): Colonoscopy (08/20/2020) negative; will repeat colonoscopy in 2030  AAA (smoker 65-76): Not indicated due to assigned sex  PAP (<65y.o): Referred to [facility] for annual well woman    ID Titers and Vaccinations:    Immunization History   Administered Date(s) Administered    COVID-19, MRNA, LN-S, PF (MODERNA FULL 0.5 ML DOSE) 06/10/2021, " "07/08/2021, 01/11/2022    Influenza 10/24/2012    Influenza - Quadrivalent 12/24/2019, 12/23/2020, 10/27/2021    Influenza - Quadrivalent - PF (6-35 months) 10/04/2018    Influenza - Quadrivalent - PF *Preferred* (6 months and older) 11/19/2022, 11/18/2023    Influenza - Trivalent - Fluarix, Flulaval, Fluzone, Afluria - PF 12/14/2011    Pneumococcal Conjugate - 20 Valent 11/18/2023    Tdap 06/14/2018, 02/01/2022    Zoster 10/27/2021    Zoster Recombinant 10/27/2021, 02/16/2022      HIV (once age 15-65):  No results found for: "HIV1X2", "TPB30TTST"     Hepatitis Screening:   Lab Results   Component Value Date    HEPCAB Nonreactive 07/22/2020      Pneumococcal vaccine (65 and over or high risk): UTD  Influenza Vaccine: UTD  Tetanus: UTD  Zoster vaccination (> 50y.o): UTD  Covid vaccine: 3 doses Pfizer    ASSESSMENT/PLAN:    Obesity  Diabetes mellitus type II  POCT A1c 10.3  -Last A1c 9.0  -patient reports she has been out of her medications due to pharmacy recently being acquired by another company and declining to refill patient medications  -initiated jardiance 25 mg qd  -continue ozempic 1 mg qweekly, lantus 16 units qhs, metformin 1000 mg bid, glipizide 10 mg qd  -referred to [facility] nutrition and diabetic education but patient was unable to contact  -will perform diabetic eye exam next visit  -will perform diabetic foot exam next visit    Hypertension  /66  -goal bp < 130/80  -bp not at goal  -increased lisinopril from 20 to 40 mg qd  -continue amlodipine 10 mg qd and hydrochlorothiazide 25 mg qd    Hyperlipidemia  The 10-year ASCVD risk score (Jose DK, et al., 2019) is: 32.8%    Values used to calculate the score:      Age: 60 years      Sex: Female      Is Non- : Yes      Diabetic: Yes      Tobacco smoker: Yes      Systolic Blood Pressure: 143 mmHg      Is BP treated: Yes      HDL Cholesterol: 45 mg/dL      Total Cholesterol: 137 mg/dL  -goal LDL < 70 given hx of diabetes  -LDL at " goal  -continue atorvastatin 40 mg qd, zetia 10 mg qd, and aspirin 81 mg qd    Vitamin D deficiency  Vitamin D 25.8  -continue cholecalciferol 1000 units daily  -will recheck vitamin d before next visit    Tobacco use disorder  -continues to smoke tobacco  -not interested in quitting at this time  -will refer back to smoking cessation when ready as patient has previously followed and quit in past    Health Maintenance:  -lab work ordered  -screenings ordered and/or UTD as above  -vaccinations as above  -initiated and/or refilled medications as above    Future Appointments   Date Time Provider Department Center   3/20/2025  1:15 PM [facility] [id number] [facility] MAMMO Lauri Hwang   3/10/2026  8:50 AM Aylin Hou FNP [facility] GYN Lauri Un       Sage Maldonado MD  [id number]

## 2025-03-20 ENCOUNTER — HOSPITAL ENCOUNTER (OUTPATIENT)
Dept: RADIOLOGY | Facility: HOSPITAL | Age: 60
Discharge: HOME | End: 2025-03-20
Payer: MEDICAID

## 2025-03-20 DIAGNOSIS — Z12.31 ENCOUNTER FOR SCREENING MAMMOGRAM FOR BREAST CANCER: ICD-10-CM

## 2025-03-20 PROCEDURE — 77063 BREAST TOMOSYNTHESIS BI: CPT | Mod: TC

## 2025-03-20 PROCEDURE — 77063 BREAST TOMOSYNTHESIS BI: CPT | Mod: 26,,, | Performed by: RADIOLOGY

## 2025-03-20 PROCEDURE — 77067 SCR MAMMO BI INCL CAD: CPT | Mod: 26,,, | Performed by: RADIOLOGY

## 2025-04-03 ENCOUNTER — HOSPITAL ENCOUNTER (OUTPATIENT)
Dept: RADIOLOGY | Facility: HOSPITAL | Age: 60
Discharge: HOME | End: 2025-04-03
Payer: MEDICAID

## 2025-04-03 DIAGNOSIS — Z87.891 PERSONAL HISTORY OF SMOKING: ICD-10-CM

## 2025-04-03 PROCEDURE — 71271 CT THORAX LUNG CANCER SCR C-: CPT | Mod: TC

## 2025-04-18 DIAGNOSIS — R91.8 MULTIPLE LUNG NODULES: ICD-10-CM

## 2025-04-18 DIAGNOSIS — R91.1 SOLITARY PULMONARY NODULE: Primary | ICD-10-CM

## 2025-04-18 NOTE — PROGRESS NOTES
Patient underwent LDCT recently which showed multiple, scattered, punctate less than 5 mm nodules seen in the left upper lobe and right upper lobe plus a new sub solid area of rounded opacity in the lingula that measures 1 cm - Lung RADS 4A. Repeat imaging in 3 months recommended. Ordered CT chest without contrast for repeat, further evaluation of lung nodules.

## 2025-05-06 ENCOUNTER — RESULTS FOLLOW-UP (OUTPATIENT)
Dept: INFECTIOUS DISEASES | Facility: CLINIC | Age: 60
End: 2025-05-06

## 2025-08-21 ENCOUNTER — HOSPITAL ENCOUNTER (OUTPATIENT)
Dept: RADIOLOGY | Facility: HOSPITAL | Age: 60
Discharge: HOME | End: 2025-08-21
Payer: MEDICAID

## 2025-08-21 DIAGNOSIS — R91.8 MULTIPLE LUNG NODULES: ICD-10-CM

## 2025-08-21 PROCEDURE — 71250 CT THORAX DX C-: CPT | Mod: TC

## 2025-08-26 ENCOUNTER — TELEPHONE (OUTPATIENT)
Dept: INTERNAL MEDICINE | Facility: CLINIC | Age: 60
End: 2025-08-26
Payer: MEDICAID